# Patient Record
Sex: MALE | Race: WHITE | NOT HISPANIC OR LATINO
[De-identification: names, ages, dates, MRNs, and addresses within clinical notes are randomized per-mention and may not be internally consistent; named-entity substitution may affect disease eponyms.]

---

## 2017-07-12 ENCOUNTER — TRANSCRIPTION ENCOUNTER (OUTPATIENT)
Age: 82
End: 2017-07-12

## 2017-07-12 ENCOUNTER — INPATIENT (INPATIENT)
Facility: HOSPITAL | Age: 82
LOS: 1 days | Discharge: HOME | End: 2017-07-14
Attending: EMERGENCY MEDICINE | Admitting: INTERNAL MEDICINE
Payer: MEDICARE

## 2017-07-12 VITALS
HEART RATE: 78 BPM | SYSTOLIC BLOOD PRESSURE: 140 MMHG | OXYGEN SATURATION: 96 % | RESPIRATION RATE: 18 BRPM | TEMPERATURE: 98 F | DIASTOLIC BLOOD PRESSURE: 67 MMHG

## 2017-07-12 DIAGNOSIS — R79.89 OTHER SPECIFIED ABNORMAL FINDINGS OF BLOOD CHEMISTRY: ICD-10-CM

## 2017-07-12 DIAGNOSIS — I10 ESSENTIAL (PRIMARY) HYPERTENSION: ICD-10-CM

## 2017-07-12 DIAGNOSIS — J44.9 CHRONIC OBSTRUCTIVE PULMONARY DISEASE, UNSPECIFIED: ICD-10-CM

## 2017-07-12 DIAGNOSIS — Z95.0 PRESENCE OF CARDIAC PACEMAKER: Chronic | ICD-10-CM

## 2017-07-12 DIAGNOSIS — N40.0 BENIGN PROSTATIC HYPERPLASIA WITHOUT LOWER URINARY TRACT SYMPTOMS: ICD-10-CM

## 2017-07-12 LAB
ALBUMIN SERPL ELPH-MCNC: 3.5 G/DL — SIGNIFICANT CHANGE UP (ref 3.3–5)
ALP SERPL-CCNC: 110 U/L — SIGNIFICANT CHANGE UP (ref 40–120)
ALT FLD-CCNC: 15 U/L — SIGNIFICANT CHANGE UP (ref 12–78)
AMMONIA BLD-MCNC: 20 UMOL/L — SIGNIFICANT CHANGE UP (ref 11–32)
ANION GAP SERPL CALC-SCNC: 12 MMOL/L — SIGNIFICANT CHANGE UP (ref 5–17)
APAP SERPL-MCNC: <2 UG/ML — LOW (ref 10–30)
APPEARANCE UR: CLEAR — SIGNIFICANT CHANGE UP
AST SERPL-CCNC: 16 U/L — SIGNIFICANT CHANGE UP (ref 15–37)
BILIRUB SERPL-MCNC: 0.4 MG/DL — SIGNIFICANT CHANGE UP (ref 0.2–1.2)
BILIRUB UR-MCNC: NEGATIVE — SIGNIFICANT CHANGE UP
BUN SERPL-MCNC: 15 MG/DL — SIGNIFICANT CHANGE UP (ref 7–23)
CALCIUM SERPL-MCNC: 9.1 MG/DL — SIGNIFICANT CHANGE UP (ref 8.5–10.1)
CHLORIDE SERPL-SCNC: 107 MMOL/L — SIGNIFICANT CHANGE UP (ref 96–108)
CO2 SERPL-SCNC: 23 MMOL/L — SIGNIFICANT CHANGE UP (ref 22–31)
COLOR SPEC: YELLOW — SIGNIFICANT CHANGE UP
CREAT SERPL-MCNC: 1.47 MG/DL — HIGH (ref 0.5–1.3)
DIFF PNL FLD: ABNORMAL
GLUCOSE SERPL-MCNC: 145 MG/DL — HIGH (ref 70–99)
GLUCOSE UR QL: NEGATIVE MG/DL — SIGNIFICANT CHANGE UP
HCT VFR BLD CALC: 34.7 % — LOW (ref 39–50)
HGB BLD-MCNC: 12.3 G/DL — LOW (ref 13–17)
INR BLD: 1 RATIO — SIGNIFICANT CHANGE UP (ref 0.88–1.16)
KETONES UR-MCNC: NEGATIVE — SIGNIFICANT CHANGE UP
LEUKOCYTE ESTERASE UR-ACNC: NEGATIVE — SIGNIFICANT CHANGE UP
MCHC RBC-ENTMCNC: 30.9 PG — SIGNIFICANT CHANGE UP (ref 27–34)
MCHC RBC-ENTMCNC: 35.5 GM/DL — SIGNIFICANT CHANGE UP (ref 32–36)
MCV RBC AUTO: 87 FL — SIGNIFICANT CHANGE UP (ref 80–100)
NITRITE UR-MCNC: NEGATIVE — SIGNIFICANT CHANGE UP
PH UR: 7 — SIGNIFICANT CHANGE UP (ref 5–8)
PLATELET # BLD AUTO: 210 K/UL — SIGNIFICANT CHANGE UP (ref 150–400)
POTASSIUM SERPL-MCNC: 3.7 MMOL/L — SIGNIFICANT CHANGE UP (ref 3.5–5.3)
POTASSIUM SERPL-SCNC: 3.7 MMOL/L — SIGNIFICANT CHANGE UP (ref 3.5–5.3)
PROT SERPL-MCNC: 7.4 GM/DL — SIGNIFICANT CHANGE UP (ref 6–8.3)
PROT UR-MCNC: 15 MG/DL
PROTHROM AB SERPL-ACNC: 10.9 SEC — SIGNIFICANT CHANGE UP (ref 9.8–12.7)
RBC # BLD: 3.99 M/UL — LOW (ref 4.2–5.8)
RBC # FLD: 14.7 % — SIGNIFICANT CHANGE UP (ref 11–15)
SALICYLATES SERPL-MCNC: <1.7 MG/DL — LOW (ref 2.8–20)
SODIUM SERPL-SCNC: 142 MMOL/L — SIGNIFICANT CHANGE UP (ref 135–145)
SP GR SPEC: 1 — LOW (ref 1.01–1.02)
TROPONIN I SERPL-MCNC: <.015 NG/ML — SIGNIFICANT CHANGE UP (ref 0.01–0.04)
UROBILINOGEN FLD QL: NEGATIVE MG/DL — SIGNIFICANT CHANGE UP
WBC # BLD: 10.4 K/UL — SIGNIFICANT CHANGE UP (ref 3.8–10.5)
WBC # FLD AUTO: 10.4 K/UL — SIGNIFICANT CHANGE UP (ref 3.8–10.5)

## 2017-07-12 PROCEDURE — 99223 1ST HOSP IP/OBS HIGH 75: CPT

## 2017-07-12 PROCEDURE — 99291 CRITICAL CARE FIRST HOUR: CPT

## 2017-07-12 PROCEDURE — 70450 CT HEAD/BRAIN W/O DYE: CPT | Mod: 26

## 2017-07-12 PROCEDURE — 99222 1ST HOSP IP/OBS MODERATE 55: CPT

## 2017-07-12 PROCEDURE — 71010: CPT | Mod: 26

## 2017-07-12 RX ORDER — THEOPHYLLINE ANHYDROUS 200 MG
400 TABLET, EXTENDED RELEASE 12 HR ORAL DAILY
Qty: 0 | Refills: 0 | Status: DISCONTINUED | OUTPATIENT
Start: 2017-07-12 | End: 2017-07-13

## 2017-07-12 RX ORDER — MONTELUKAST 4 MG/1
1 TABLET, CHEWABLE ORAL
Qty: 0 | Refills: 0 | COMMUNITY

## 2017-07-12 RX ORDER — DOCUSATE SODIUM 100 MG
100 CAPSULE ORAL
Qty: 0 | Refills: 0 | Status: DISCONTINUED | OUTPATIENT
Start: 2017-07-12 | End: 2017-07-14

## 2017-07-12 RX ORDER — HEPARIN SODIUM 5000 [USP'U]/ML
5000 INJECTION INTRAVENOUS; SUBCUTANEOUS EVERY 12 HOURS
Qty: 0 | Refills: 0 | Status: DISCONTINUED | OUTPATIENT
Start: 2017-07-12 | End: 2017-07-14

## 2017-07-12 RX ORDER — ENOXAPARIN SODIUM 100 MG/ML
80 INJECTION SUBCUTANEOUS ONCE
Qty: 0 | Refills: 0 | Status: COMPLETED | OUTPATIENT
Start: 2017-07-12 | End: 2017-07-12

## 2017-07-12 RX ORDER — LATANOPROST 0.05 MG/ML
1 SOLUTION/ DROPS OPHTHALMIC; TOPICAL
Qty: 0 | Refills: 0 | COMMUNITY

## 2017-07-12 RX ORDER — LISINOPRIL 2.5 MG/1
40 TABLET ORAL DAILY
Qty: 0 | Refills: 0 | Status: DISCONTINUED | OUTPATIENT
Start: 2017-07-12 | End: 2017-07-14

## 2017-07-12 RX ORDER — SODIUM CHLORIDE 9 MG/ML
1000 INJECTION INTRAMUSCULAR; INTRAVENOUS; SUBCUTANEOUS
Qty: 0 | Refills: 0 | Status: DISCONTINUED | OUTPATIENT
Start: 2017-07-12 | End: 2017-07-12

## 2017-07-12 RX ORDER — TIOTROPIUM BROMIDE 18 UG/1
1 CAPSULE ORAL; RESPIRATORY (INHALATION)
Qty: 0 | Refills: 0 | COMMUNITY

## 2017-07-12 RX ORDER — LATANOPROST 0.05 MG/ML
1 SOLUTION/ DROPS OPHTHALMIC; TOPICAL DAILY
Qty: 0 | Refills: 0 | Status: DISCONTINUED | OUTPATIENT
Start: 2017-07-12 | End: 2017-07-12

## 2017-07-12 RX ORDER — TIOTROPIUM BROMIDE 18 UG/1
1 CAPSULE ORAL; RESPIRATORY (INHALATION) DAILY
Qty: 0 | Refills: 0 | Status: DISCONTINUED | OUTPATIENT
Start: 2017-07-12 | End: 2017-07-14

## 2017-07-12 RX ORDER — LATANOPROST 0.05 MG/ML
1 SOLUTION/ DROPS OPHTHALMIC; TOPICAL AT BEDTIME
Qty: 0 | Refills: 0 | Status: DISCONTINUED | OUTPATIENT
Start: 2017-07-12 | End: 2017-07-14

## 2017-07-12 RX ORDER — ALBUTEROL 90 UG/1
2 AEROSOL, METERED ORAL
Qty: 0 | Refills: 0 | COMMUNITY

## 2017-07-12 RX ORDER — METOPROLOL TARTRATE 50 MG
1 TABLET ORAL
Qty: 0 | Refills: 0 | COMMUNITY

## 2017-07-12 RX ORDER — THEOPHYLLINE ANHYDROUS 200 MG
400 TABLET, EXTENDED RELEASE 12 HR ORAL DAILY
Qty: 0 | Refills: 0 | Status: DISCONTINUED | OUTPATIENT
Start: 2017-07-12 | End: 2017-07-12

## 2017-07-12 RX ORDER — MONTELUKAST 4 MG/1
10 TABLET, CHEWABLE ORAL DAILY
Qty: 0 | Refills: 0 | Status: DISCONTINUED | OUTPATIENT
Start: 2017-07-12 | End: 2017-07-14

## 2017-07-12 RX ORDER — METOPROLOL TARTRATE 50 MG
50 TABLET ORAL DAILY
Qty: 0 | Refills: 0 | Status: DISCONTINUED | OUTPATIENT
Start: 2017-07-12 | End: 2017-07-14

## 2017-07-12 RX ORDER — LISINOPRIL 2.5 MG/1
1 TABLET ORAL
Qty: 0 | Refills: 0 | COMMUNITY

## 2017-07-12 RX ORDER — FINASTERIDE 5 MG/1
5 TABLET, FILM COATED ORAL DAILY
Qty: 0 | Refills: 0 | Status: DISCONTINUED | OUTPATIENT
Start: 2017-07-12 | End: 2017-07-14

## 2017-07-12 RX ORDER — SODIUM CHLORIDE 9 MG/ML
1000 INJECTION, SOLUTION INTRAVENOUS
Qty: 0 | Refills: 0 | Status: DISCONTINUED | OUTPATIENT
Start: 2017-07-12 | End: 2017-07-14

## 2017-07-12 RX ORDER — TAMSULOSIN HYDROCHLORIDE 0.4 MG/1
0.4 CAPSULE ORAL AT BEDTIME
Qty: 0 | Refills: 0 | Status: DISCONTINUED | OUTPATIENT
Start: 2017-07-12 | End: 2017-07-14

## 2017-07-12 RX ORDER — THEOPHYLLINE ANHYDROUS 200 MG
1 TABLET, EXTENDED RELEASE 12 HR ORAL
Qty: 0 | Refills: 0 | COMMUNITY

## 2017-07-12 RX ORDER — ALBUTEROL 90 UG/1
2 AEROSOL, METERED ORAL EVERY 6 HOURS
Qty: 0 | Refills: 0 | Status: DISCONTINUED | OUTPATIENT
Start: 2017-07-12 | End: 2017-07-14

## 2017-07-12 RX ADMIN — FINASTERIDE 5 MILLIGRAM(S): 5 TABLET, FILM COATED ORAL at 17:49

## 2017-07-12 RX ADMIN — ENOXAPARIN SODIUM 80 MILLIGRAM(S): 100 INJECTION SUBCUTANEOUS at 12:42

## 2017-07-12 RX ADMIN — Medication 100 MILLIGRAM(S): at 17:49

## 2017-07-12 RX ADMIN — HEPARIN SODIUM 5000 UNIT(S): 5000 INJECTION INTRAVENOUS; SUBCUTANEOUS at 17:49

## 2017-07-12 RX ADMIN — SODIUM CHLORIDE 100 MILLILITER(S): 9 INJECTION, SOLUTION INTRAVENOUS at 17:49

## 2017-07-12 RX ADMIN — TIOTROPIUM BROMIDE 1 CAPSULE(S): 18 CAPSULE ORAL; RESPIRATORY (INHALATION) at 23:32

## 2017-07-12 RX ADMIN — TAMSULOSIN HYDROCHLORIDE 0.4 MILLIGRAM(S): 0.4 CAPSULE ORAL at 23:32

## 2017-07-12 RX ADMIN — MONTELUKAST 10 MILLIGRAM(S): 4 TABLET, CHEWABLE ORAL at 23:31

## 2017-07-12 RX ADMIN — Medication 400 MILLIGRAM(S): at 23:30

## 2017-07-12 NOTE — ED ADULT TRIAGE NOTE - CHIEF COMPLAINT QUOTE
found unresponsive by home attendant. now pt has facial droop. last seen normal 0645. unknown medical history. found unresponsive by home attendant. now pt has facial droop. last seen normal 0645. unknown medical history. fs 137

## 2017-07-12 NOTE — ED PROVIDER NOTE - PROGRESS NOTE DETAILS
pt's son is Jeff Hopkins, [phone], physician, as per A spoke with son, farheen sanchez, he gave further hx on father, updated hpi and pmh  ordered MRI brain now with neuro Dr. BAUTISTA on board, pt. out of window for tpa, will admit med

## 2017-07-12 NOTE — DISCHARGE NOTE ADULT - NS AS DC STROKE ED MATERIALS
Risk Factors for Stroke/Prescribed Medications/Stroke Warning Signs and Symptoms/Stroke Education Booklet/Call 911 for Stroke/Need for Followup After Discharge Prescribed Medications/Need for Followup After Discharge/Stroke Warning Signs and Symptoms/Call 911 for Stroke/Stroke Education Booklet/Risk Factors for Stroke

## 2017-07-12 NOTE — H&P ADULT - HISTORY OF PRESENT ILLNESS
87yr old male PMH HTN, TIA, PPM, BPH, COPD, BIBA by ambulance with AMS, upgraded to code stroke upon presentation. As per EMS pt. was found unresponsive in chair, difficult to arouse. Family was present in the home when EMS arrived as per HHA for pt.'s wife she says pt. was making strange sounds and foaming at the mouth earlier. Pt. normally is AxOx 2-3 communicates and moves well. Upon arrival to the ER pt. was sleepy, arousable to pain, mumbling with no meaningful communication. Pt. is now responsive  AxOx1-2, speech is clear.  Pt. denies chest pain, SOB, palpitations, N&V, chills, fever. 87yr old male PMH HTN, TIA, PPM, BPH, COPD, BIBA by ambulance with AMS, upgraded to code stroke upon presentation. As per EMS pt. was found unresponsive in chair, difficult to arouse. Family was present in the home when EMS arrived as per HHA for pt.'s wife she says pt. was making strange sounds and foaming at the mouth earlier. Pt. normally is AxOx2-3 communicates and moves well. Upon arrival to the ER pt. was sleepy, arousable to pain, mumbling with no meaningful communication. Pt. is now responsive  AxOx1-2, speech is clear.  Pt. denies chest pain, SOB, palpitations, N&V, chills, fever.    Head w/o contrast revealed no acute intracranial hemorrhage or acute territorial infarct.   MRI is contraindicated due to PMH of PPM

## 2017-07-12 NOTE — DISCHARGE NOTE ADULT - OTHER SIGNIFICANT FINDINGS
< from: US Duplex Carotid Arteries Complete, Bilateral (07.13.17 @ 13:52) >  Impression: No hemodynamically significant stenosis in the right internal   carotid artery by velocity criteria.    Moderate stenosis (50-69%) in the left internal carotid artery by   velocity criteria.    Apparent 1 episode of cardiac arrhythmia is noted.      < from: TTE Echo Doppler w/o Cont (07.13.17 @ 12:20) >   Summary:   1. Left ventricular ejection fraction, byvisual estimation, is 60 to   65%.   2. Normal global left ventricular systolic function.   3. Normal left ventricular internal cavity size.   4. Normal right ventricular size and function.   5. There is no evidence of pericardial effusion.   6. Mildmitral annular calcification.   7. Mild mitral valve regurgitation.   8. Thickening and calcification of the anterior and posterior mitral   valve leaflets.   9. Sclerotic aortic valve with normal opening.  10. Mildly enlarged left atrium.  11. Thereis mild aortic root calcification.    < from: CT Brain Stroke Protocol (07.12.17 @ 08:01) >    IMPRESSION:  No acute intracranial hemorrhage or acute territorial infarct. If acute   ischemia is a strong clinical concern, MRI exam is recommended for   further evaluation if there is no contraindication.    8 mm nonspecific lucent lesion right frontal calvarium to small to   characterize could represent hemangioma. Nonemergent MRI exam can be   obtained for further evaluation to exclude other neoplastic lesions.

## 2017-07-12 NOTE — ED ADULT NURSE NOTE - CHIEF COMPLAINT QUOTE
found unresponsive by home attendant. now pt has facial droop. last seen normal 0645. unknown medical history. fs 137

## 2017-07-12 NOTE — DISCHARGE NOTE ADULT - ADDITIONAL INSTRUCTIONS
follow with PCP Dr. Branham at [facility], ([facility] and [facility]. [facility] in [place])x 3 days  follow with Neurology x 1 week  follow with Cardiology x 1 week

## 2017-07-12 NOTE — H&P ADULT - PMH
BPH (benign prostatic hyperplasia)    COPD (chronic obstructive pulmonary disease)    HTN (hypertension)    TIA (transient ischemic attack) BPH (benign prostatic hyperplasia)    COPD (chronic obstructive pulmonary disease)    Glaucoma    HTN (hypertension)    TIA (transient ischemic attack)

## 2017-07-12 NOTE — DISCHARGE NOTE ADULT - CONDITION (STATED IN TERMS THAT PERMIT A SPECIFIC MEASURABLE COMPARISON WITH CONDITION ON ADMISSION):
Patient is stable: tolerating diet, voiding, ambulating, no pain.  Vital Signs Last 24 Hrs  T(C): 37 (14 Jul 2017 10:45), Max: 37.1 (14 Jul 2017 06:02)  T(F): 98.6 (14 Jul 2017 10:45), Max: 98.8 (14 Jul 2017 06:02)  HR: 62 (14 Jul 2017 10:45) (60 - 63)  BP: 112/53 (14 Jul 2017 10:45) (112/53 - 150/60)  BP(mean): --  RR: 18 (14 Jul 2017 10:45) (18 - 18)  SpO2: 97% (14 Jul 2017 10:45) (97% - 98%)  75 minutes spent in direct patient care including physical examination, discharge instructions , medication instructions and follow up, patient verbalized understanding and agreed.

## 2017-07-12 NOTE — DISCHARGE NOTE ADULT - HOSPITAL COURSE
87yr old male PMH HTN, TIA, PPM, BPH, COPD, BIBA by ambulance with AMS, upgraded to code stroke upon presentation. As per EMS pt. was found unresponsive in chair, difficult to arouse. Family was present in the home when EMS arrived as per HHA for pt.'s wife she says pt. was making strange sounds and foaming at the mouth earlier. Pt. normally is AxOx2-3 communicates and moves well. Upon arrival to the ER pt. was sleepy, arousable to pain, mumbling with no meaningful communication. In ED upon hospitalist eval, Pt.  responsive  AxOx1-2, speech is clear.  Pt. denies chest pain, SOB, palpitations, N&V, chills, fever.    Head CT w/o contrast revealed no acute intracranial hemorrhage or acute territorial infarct.   MRI is contraindicated due to PMH of PPM , C. doppler with left ICA mild stenosis.  Neurology and cardio consulted, CTH negative for acute stroke, for carotid stenosis vascular surgery shay/Dr. Ashraf called, d/w him, recommend medical Rx and outpatient f/u, no intervention.  As per cardio recomm, he had PPM interrogation, with ?PAF, d/w cardio, as well as with patient, Patient not a candidate for long term AC given fall risk, and elderly, pt in agreement with it. as per wife HHA at home as well as patient pharmacy he was not on any AC in past. Called phone # of son provided in chart , both # not working ([phone]/ [phone] ) Patient states he does not remember son's phone # as son lives in [place].  His lasix was held upon admission given BOB, now creatinine back to baseline and pt to follow with PCP and primary cardiologist as outpatient.  For abnormal lucent lesion on CTH  pt is advised to follow with PCP for further w/u as outpatient.

## 2017-07-12 NOTE — H&P ADULT - PROBLEM SELECTOR PLAN 2
Continue metoprolol 50mg, lisinopril 40mg   2D-echo   telemetry monitoring  Cardiology consult Dr. Ga

## 2017-07-12 NOTE — ED PROVIDER NOTE - PHYSICAL EXAMINATION
Vitals: WNL  Gen: sleepy, but arousable to pain, NAD, lying comfortably in stretcher, answers to his name, mumbles, no meaningful communication  Head: ncat, perrla, eomi b/l  Neck: supple, no lymphadenopathy, no midline deviation  Heart: rrr, no m/r/g  Lungs: CTA b/l, no rales/ronchi/wheezes  Abd: soft, nontender, non-distended, no rebound or guarding  Ext: no clubbing/cyanosis/edema  Neuro: will not follow commands for exam, withdrawing from pain equally in all 4 ext, no facial droop appreciated, no drooling

## 2017-07-12 NOTE — CONSULT NOTE ADULT - SUBJECTIVE AND OBJECTIVE BOX
Chief Complaint:  Patient is a 87y old  Male who presents with a chief complaint of "I just passed out". (2017 13:50)      HPI:  87yr old male PMH HTN, TIA, PPM, BPH, COPD, BIBA by ambulance with AMS, upgraded to code stroke upon presentation. As per EMS, pt. was found unresponsive in chair, difficult to arouse. Family was present in the home when EMS arrived as per HHA for pt.'s wife she says pt. was making strange sounds and foaming at the mouth earlier. Pt. normally is AxOx2-3 communicates and moves well. Upon arrival to the ER pt. was sleepy, arousable to pain, mumbling with no meaningful communication. Pt. is now responsive  AxOx1-2, speech is clear.  Pt. denies chest pain, SOB, palpitations, N&V, chills, fever.    Head w/o contrast revealed no acute intracranial hemorrhage or acute territorial infarct.   MRI is contraindicated due to PMH of PPM (2017 12:18)    Allergies and Intolerances:        Allergies:  	No Known Allergies:     Home Medications:   * Incomplete Medication History as of 2017 08:12 documented in Prescription Writer      Past Medical History:  BPH (benign prostatic hyperplasia)    COPD (chronic obstructive pulmonary disease)    Glaucoma    HTN (hypertension)    TIA (transient ischemic attack).    Past Surgical History:  Cardiac pacemaker.    Tobacco Screening:  · Core Measure Site	Yes	  · Has the patient used tobacco in the past 30 days? 	No	  Review of Systems:  General:  No wt loss, fevers, chills, night sweats; positive for syncope.  Eyes:  Good vision, no reported pain  ENT:  No sore throat, pain, runny nose, dysphagia  CV:  No pain, palpitations hypo/hypertension  Resp:  No dyspnea, cough, tachypnea, wheezing  GI:  No pain, nausea, vomiting, diarrhea, constipatiion  :  No pain, bleeding, incontinence, nocturia  Muscle:  No pain, weakness  Breast:  No pain, abscess, mass, discharge  Neuro:  No weakness, tingling, memory problems  Psych:  No fatigue, insomnia, mood problems, depression  Endocrine:  No polyuria, polydipsia cold/heat intolerance  Heme:  No petechiae, ecchymosis, easy bruisability  Skin:  No rash, edema      Physical Exam:  Vital Signs:  Vital Signs Last 24 Hrs  T(C): 36.9 (2017 15:14), Max: 36.9 (2017 15:14)  T(F): 98.4 (2017 15:14), Max: 98.4 (2017 15:14)  HR: 64 (2017 15:14) (64 - 78)  BP: 136/80 (2017 15:14) (136/80 - 140/69)  RR: 16 (2017 15:14) (16 - 18)  SpO2: 98% (2017 15:14) (96% - 100%)  Daily Height in cm: 172.72 (2017 15:14)      General:  Appears stated age, well-groomed, well-nourished, no distress  HEENT:  NC/AT, patent nares w/ pink mucosa, OP clear w/o lesions, conjunctivae clear, no thyromegaly, nodules, adenopathy, no JVD  Chest:  Full & symmetric excursion, no increased effort, breath sounds clear  Cardiovascular:  Regular rhythm, S1, S2, no murmur/rub/S3/S4, no carotid/femoral/abdominal bruit, radial/pedal pulses 2+  Abdomen:  Soft, non-tender, non-distended, normoactive bowel sounds  Extremities:  no edema  Skin:  No rash. Skin is warm/dry  Musculoskeletal:  N/A  Neuro/Psych:  Alert, oriented    Laboratory:                            12.3   10.4  )-----------( 210      ( 2017 08:13 )             34.7     07-12    142  |  107  |  15  ----------------------------<  145<H>  3.7   |  23  |  1.47<H>    Ca    9.1      2017 08:13    TPro  7.4  /  Alb  3.5  /  TBili  0.4  /  DBili  x   /  AST  16  /  ALT  15  /  AlkPhos  110  07-12      CARDIAC MARKERS ( 2017 08:13 )  <.015 ng/mL / x     / x     / x     / x            LIVER FUNCTIONS - ( 2017 08:13 )  Alb: 3.5 g/dL / Pro: 7.4 gm/dL / ALK PHOS: 110 U/L / ALT: 15 U/L / AST: 16 U/L / GGT: x           PT/INR - ( 2017 08:13 )   PT: 10.9 sec;   INR: 1.00 ratio         Urinalysis Basic - ( 2017 08:43 )    Color: Yellow / Appearance: Clear / S.005 / pH: x  Gluc: x / Ketone: Negative  / Bili: Negative / Urobili: Negative mg/dL   Blood: x / Protein: 15 mg/dL / Nitrite: Negative   Leuk Esterase: Negative / RBC: 6-10 /HPF / WBC x   Sq Epi: x / Non Sq Epi: x / Bacteria: x        Imaging:  < from: Xray Chest 1 View AP/PA. (17 @ 08:55) >    EXAM:  CHEST SINGLE VIEW                            PROCEDURE DATE:  2017          INTERPRETATION:    DATE OF EXAM:  .    COMPARISON: 08.    CLINICAL INDICATION: 87-year-old male - for stroke  code - has abnormal   chest sounds.    TECHNIQUE: Portable chest.    FINDINGS:   S/P sternotomy. There has been interval placement of left-sided AICD with   intact electrodes to the heart.   The cardiac and mediastinal contours are prominent, which may be due to   exaggeration from AP technique and shallow inspiration.   Indistinctness of bronchovascular markings in the perihilar regions may   be due to mild interstitial edema. There are linear atelectases at left   lung base. No bilateral focal infiltrates. No significant pleural   effusion. No pneumothorax.  There are degenerative changes of the thoracic spine.    IMPRESSION:   Cannot exclude mild interstitial edema changes as above.  No discrete bilateral focal infiltrates.    < end of copied text >    < from: CT Brain Stroke Protocol (17 @ 08:01) >  EXAM:  CT BRAIN STROKE PROTOCOL                            PROCEDURE DATE:  2017          INTERPRETATION:  CLINICAL STATEMENT: AMS. Code stroke    TECHNIQUE: CT of the head was performed without IV contrast. Notified for   interpretation on 2017 at 8:14 AM    COMPARISON: None.    FINDINGS:  There is moderate diffuse parenchymal volume loss. There are areas of low   attenuation in the periventricular white matter likely related to mild   chronic microvascular ischemic changes.    There is no acute parenchymal hemorrhage, parenchymal mass, mass effect   or midline shift. There is no extra-axial fluid collection. There is no   acute territorial infarct. There is no hydrocephalus. Nonspecific   prominence of bilateral optic nerve sheaths noted.    The cranium is intact. Nonspecific 8 mm lucent lesion noted in the right   frontal calvarium too Small to characterize    Mucosal thickening paranasal sinuses.    IMPRESSION:  No acute intracranial hemorrhage or acute territorial infarct. If acute   ischemia is a strong clinical concern, MRI exam is recommended for   further evaluation if there is no contraindication.    8 mm nonspecific lucent lesion right frontal calvarium to small to   characterize could represent hemangioma. Nonemergent MRI exam can be   obtained for further evaluation to exclude other neoplastic lesions.    < end of copied text >    Assessment:  87yr old male PMH HTN, TIA, PPM, BPH, COPD, BIBA by ambulance with AMS, upgraded to code stroke upon presentation. As per EMS, pt. was found unresponsive in chair, difficult to arouse. Family was present in the home when EMS arrived as per HHA for pt.'s wife she says pt. was making strange sounds and foaming at the mouth earlier. Pt. normally is AxOx2-3 communicates and moves well. Upon arrival to the ER pt. was sleepy, arousable to pain, mumbling with no meaningful communication. Pt. is now responsive  AxOx1-2, speech is clear.  Pt. denies chest pain, SOB, palpitations, N&V, chills, fever.    Head w/o contrast revealed no acute intracranial hemorrhage or acute territorial infarct.     Plan:     Tele monitoring.  Check echo.    Con't with:  MEDICATIONS  (STANDING):  heparin  Injectable 5000 Unit(s) SubCutaneous every 12 hours  finasteride 5 milliGRAM(s) Oral daily  lisinopril 40 milliGRAM(s) Oral daily  tamsulosin 0.4 milliGRAM(s) Oral at bedtime  metoprolol succinate ER 50 milliGRAM(s) Oral daily  tiotropium 18 MICROgram(s) Capsule 1 Capsule(s) Inhalation daily  docusate sodium 100 milliGRAM(s) Oral two times a day  montelukast 10 milliGRAM(s) Oral daily  latanoprost 0.005% Ophthalmic Solution 1 Drop(s) Both EYES at bedtime  theophylline SR Tablet 400 milliGRAM(s) Oral daily  dextrose 5% + sodium chloride 0.9%. 1000 milliLiter(s) (100 mL/Hr) IV Continuous <Continuous>    Supportive care management.    Gianfranco Gray MD, FACC, FASE, FASNC, FACP  Director, Heart Failure Services  [facility]  , Department of Cardiology  [facility] Chief Complaint:  Patient is a 87y old  Male who presents with a chief complaint of "I just passed out". (2017 13:50)      HPI:  87yr old male PMH HTN, TIA, PPM, BPH, COPD, BIBA by ambulance with AMS, upgraded to code stroke upon presentation. As per EMS, pt. was found unresponsive in chair, difficult to arouse. Family was present in the home when EMS arrived as per HHA for pt.'s wife she says pt. was making strange sounds and foaming at the mouth earlier. Pt. normally is AxOx2-3 communicates and moves well. Upon arrival to the ER pt. was sleepy, arousable to pain, mumbling with no meaningful communication. Pt. is now responsive  AxOx1-2, speech is clear.  Pt. denies chest pain, SOB, palpitations, N&V, chills, fever.    Head w/o contrast revealed no acute intracranial hemorrhage or acute territorial infarct.   MRI is contraindicated due to PMH of PPM (2017 12:18)    Allergies and Intolerances:        Allergies:  	No Known Allergies:     Home Medications:   * Incomplete Medication History as of 2017 08:12 documented in Prescription Writer      Past Medical History:  BPH (benign prostatic hyperplasia)    COPD (chronic obstructive pulmonary disease)    Glaucoma    HTN (hypertension)    TIA (transient ischemic attack).    Past Surgical History:  Cardiac pacemaker.    Tobacco Screening:  · Core Measure Site	Yes	  · Has the patient used tobacco in the past 30 days? 	No	  Review of Systems:  General:  No wt loss, fevers, chills, night sweats; positive for syncope.  Eyes:  Good vision, no reported pain  ENT:  No sore throat, pain, runny nose, dysphagia  CV:  No pain, palpitations hypo/hypertension  Resp:  No dyspnea, cough, tachypnea, wheezing  GI:  No pain, nausea, vomiting, diarrhea, constipatiion  :  No pain, bleeding, incontinence, nocturia  Muscle:  No pain, weakness  Breast:  No pain, abscess, mass, discharge  Neuro:  No weakness, tingling, memory problems  Psych:  No fatigue, insomnia, mood problems, depression  Endocrine:  No polyuria, polydipsia cold/heat intolerance  Heme:  No petechiae, ecchymosis, easy bruisability  Skin:  No rash, edema      Physical Exam:  Vital Signs:  Vital Signs Last 24 Hrs  T(C): 36.9 (2017 15:14), Max: 36.9 (2017 15:14)  T(F): 98.4 (2017 15:14), Max: 98.4 (2017 15:14)  HR: 64 (2017 15:14) (64 - 78)  BP: 136/80 (2017 15:14) (136/80 - 140/69)  RR: 16 (2017 15:14) (16 - 18)  SpO2: 98% (2017 15:14) (96% - 100%)  Daily Height in cm: 172.72 (2017 15:14)      Tele: V-paced.  General:  Appears stated age, well-groomed, well-nourished, no distress  HEENT:  NC/AT, patent nares w/ pink mucosa, OP clear w/o lesions, conjunctivae clear, no thyromegaly, nodules, adenopathy, no JVD  Chest:  Full & symmetric excursion, no increased effort, breath sounds clear  Cardiovascular:  Regular rhythm, S1, S2, no murmur/rub/S3/S4, no carotid/femoral/abdominal bruit, radial/pedal pulses 2+  Abdomen:  Soft, non-tender, non-distended, normoactive bowel sounds  Extremities:  no edema  Skin:  No rash. Skin is warm/dry  Musculoskeletal:  N/A  Neuro/Psych:  Alert, oriented    Laboratory:                            12.3   10.4  )-----------( 210      ( 2017 08:13 )             34.7     07-12    142  |  107  |  15  ----------------------------<  145<H>  3.7   |  23  |  1.47<H>    Ca    9.1      2017 08:13    TPro  7.4  /  Alb  3.5  /  TBili  0.4  /  DBili  x   /  AST  16  /  ALT  15  /  AlkPhos  110  07-12      CARDIAC MARKERS ( 2017 08:13 )  <.015 ng/mL / x     / x     / x     / x            LIVER FUNCTIONS - ( 2017 08:13 )  Alb: 3.5 g/dL / Pro: 7.4 gm/dL / ALK PHOS: 110 U/L / ALT: 15 U/L / AST: 16 U/L / GGT: x           PT/INR - ( 2017 08:13 )   PT: 10.9 sec;   INR: 1.00 ratio         Urinalysis Basic - ( 2017 08:43 )    Color: Yellow / Appearance: Clear / S.005 / pH: x  Gluc: x / Ketone: Negative  / Bili: Negative / Urobili: Negative mg/dL   Blood: x / Protein: 15 mg/dL / Nitrite: Negative   Leuk Esterase: Negative / RBC: 6-10 /HPF / WBC x   Sq Epi: x / Non Sq Epi: x / Bacteria: x        Imaging:  ecg: V-paced rhythm    < from: Xray Chest 1 View AP/PA. (17 @ 08:55) >    EXAM:  CHEST SINGLE VIEW                            PROCEDURE DATE:  2017          INTERPRETATION:    DATE OF EXAM:  .    COMPARISON: 08.    CLINICAL INDICATION: 87-year-old male - for stroke  code - has abnormal   chest sounds.    TECHNIQUE: Portable chest.    FINDINGS:   S/P sternotomy. There has been interval placement of left-sided AICD with   intact electrodes to the heart.   The cardiac and mediastinal contours are prominent, which may be due to   exaggeration from AP technique and shallow inspiration.   Indistinctness of bronchovascular markings in the perihilar regions may   be due to mild interstitial edema. There are linear atelectases at left   lung base. No bilateral focal infiltrates. No significant pleural   effusion. No pneumothorax.  There are degenerative changes of the thoracic spine.    IMPRESSION:   Cannot exclude mild interstitial edema changes as above.  No discrete bilateral focal infiltrates.    < end of copied text >    < from: CT Brain Stroke Protocol (17 @ 08:01) >  EXAM:  CT BRAIN STROKE PROTOCOL                            PROCEDURE DATE:  2017          INTERPRETATION:  CLINICAL STATEMENT: AMS. Code stroke    TECHNIQUE: CT of the head was performed without IV contrast. Notified for   interpretation on 2017 at 8:14 AM    COMPARISON: None.    FINDINGS:  There is moderate diffuse parenchymal volume loss. There are areas of low   attenuation in the periventricular white matter likely related to mild   chronic microvascular ischemic changes.    There is no acute parenchymal hemorrhage, parenchymal mass, mass effect   or midline shift. There is no extra-axial fluid collection. There is no   acute territorial infarct. There is no hydrocephalus. Nonspecific   prominence of bilateral optic nerve sheaths noted.    The cranium is intact. Nonspecific 8 mm lucent lesion noted in the right   frontal calvarium too Small to characterize    Mucosal thickening paranasal sinuses.    IMPRESSION:  No acute intracranial hemorrhage or acute territorial infarct. If acute   ischemia is a strong clinical concern, MRI exam is recommended for   further evaluation if there is no contraindication.    8 mm nonspecific lucent lesion right frontal calvarium to small to   characterize could represent hemangioma. Nonemergent MRI exam can be   obtained for further evaluation to exclude other neoplastic lesions.    < end of copied text >    Assessment:  87yr old male PMH HTN, TIA, PPM, BPH, COPD, BIBA by ambulance with AMS, upgraded to code stroke upon presentation. As per EMS, pt. was found unresponsive in chair, difficult to arouse. Family was present in the home when EMS arrived as per HHA for pt.'s wife she says pt. was making strange sounds and foaming at the mouth earlier. Pt. normally is AxOx2-3 communicates and moves well. Upon arrival to the ER pt. was sleepy, arousable to pain, mumbling with no meaningful communication. Pt. is now responsive  AxOx1-2, speech is clear.  Pt. denies chest pain, SOB, palpitations, N&V, chills, fever.    Head w/o contrast revealed no acute intracranial hemorrhage or acute territorial infarct.     Plan:     Tele monitoring.  Check echo.    Con't with:  MEDICATIONS  (STANDING):  heparin  Injectable 5000 Unit(s) SubCutaneous every 12 hours  finasteride 5 milliGRAM(s) Oral daily  lisinopril 40 milliGRAM(s) Oral daily  tamsulosin 0.4 milliGRAM(s) Oral at bedtime  metoprolol succinate ER 50 milliGRAM(s) Oral daily  tiotropium 18 MICROgram(s) Capsule 1 Capsule(s) Inhalation daily  docusate sodium 100 milliGRAM(s) Oral two times a day  montelukast 10 milliGRAM(s) Oral daily  latanoprost 0.005% Ophthalmic Solution 1 Drop(s) Both EYES at bedtime  theophylline SR Tablet 400 milliGRAM(s) Oral daily  dextrose 5% + sodium chloride 0.9%. 1000 milliLiter(s) (100 mL/Hr) IV Continuous <Continuous>    Supportive care management.    Gianfranco Gray MD, FACC, FASE, FASNC, FACP  Director, Heart Failure Services  [facility]  , Department of Cardiology  [facility] [place] [facility]

## 2017-07-12 NOTE — H&P ADULT - ASSESSMENT
87yr old male PMH HTN, TIA, PPM, BPH, COPD, BIBA by ambulance with AMS, upgraded to code stroke upon presentation. Pt. was found unresponsive in chair, difficult to arouse. Per HHA for pt.'s wife she says pt. was making strange sounds and foaming at the mouth earlier. Pt. normally is AxOx2-3 communicates and moves well. Upon arrival to the ER pt. was sleepy, arousable to pain, mumbling with no meaningful communication. Pt. is now responsive  AxOx1-2, speech is clear.    Head w/o contrast revealed no acute intracranial hemorrhage or acute territorial infarct.   MRI is contraindicated due to PMH of PPM

## 2017-07-12 NOTE — DISCHARGE NOTE ADULT - CARE PROVIDERS DIRECT ADDRESSES
,DirectAddress_Unknown,DirectAddress_Unknown,[email or web address]

## 2017-07-12 NOTE — H&P ADULT - NSHPREVIEWOFSYSTEMS_GEN_ALL_CORE
CONSTITUTIONAL: No fever, weight loss, or fatigue  EYES: No eye pain, visual disturbances, or discharge  ENMT:  No difficulty hearing, tinnitus, vertigo; No sinus or throat pain  NECK: No pain or stiffness  BREASTS: No pain, masses, or nipple discharge  RESPIRATORY: No cough, wheezing, chills or hemoptysis; No shortness of breath  CARDIOVASCULAR: No chest pain, palpitations, dizziness, or leg swelling  GASTROINTESTINAL: No abdominal or epigastric pain. No nausea, vomiting, or hematemesis; No diarrhea or constipation. No melena or hematochezia.  GENITOURINARY: No dysuria, frequency, hematuria, or incontinence  NEUROLOGICAL: No headaches, memory loss, loss of strength, numbness, or tremors  SKIN: No itching, burning, rashes, or lesions   LYMPH NODES: No enlarged glands  ENDOCRINE: No heat or cold intolerance; No hair loss  MUSCULOSKELETAL: No joint pain or swelling; No muscle, back, or extremity pain  PSYCHIATRIC: No depression, anxiety, mood swings, or difficulty sleeping  HEME/LYMPH: No easy bruising, or bleeding gums  ALLERGY AND IMMUNOLOGIC: No hives or eczema CONSTITUTIONAL: No fever, weight loss, or fatigue  EYES: No eye pain, visual disturbances, or discharge  ENMT:  No difficulty hearing, tinnitus, vertigo; No sinus or throat pain  NECK: No pain or stiffness  BREASTS: No pain, masses, or nipple discharge  RESPIRATORY: No cough, wheezing, chills or hemoptysis; No shortness of breath  CARDIOVASCULAR: No chest pain, palpitations, dizziness, or leg swelling  GASTROINTESTINAL: No abdominal or epigastric pain. No nausea, vomiting, or hematemesis; No diarrhea or constipation. No melena or hematochezia.  GENITOURINARY: No dysuria, frequency, hematuria, or incontinence  NEUROLOGICAL: No headaches, loss of strength, numbness, or tremors  SKIN: No itching, burning, rashes, or lesions   LYMPH NODES: No enlarged glands  ENDOCRINE: No heat or cold intolerance; No hair loss  MUSCULOSKELETAL: No joint pain or swelling; No muscle, back, or extremity pain  PSYCHIATRIC: No depression, anxiety, mood swings, or difficulty sleeping  HEME/LYMPH: No easy bruising, or bleeding gums  ALLERGY AND IMMUNOLOGIC: No hives or eczema

## 2017-07-12 NOTE — H&P ADULT - NSHPLABSRESULTS_GEN_ALL_CORE
No acute intracranial hemorrhage or acute territorial infarct.   8 mm nonspecific lucent lesion right frontal calvarium to small to   characterize could represent hemangioma.

## 2017-07-12 NOTE — ED ADULT NURSE NOTE - OBJECTIVE STATEMENT
patient received, awake, responds to verbal although unable to follow commands and appears to have difficulty speaking. no facial droop noted.

## 2017-07-12 NOTE — PATIENT PROFILE ADULT. - FUNCTIONAL SCREEN CURRENT LEVEL: COMMUNICATION, MLM
(2) difficulty understanding (not related to language barrier) (0) understands/communicates without difficulty/patient is hard of hearing

## 2017-07-12 NOTE — H&P ADULT - NSHPPHYSICALEXAM_GEN_ALL_CORE
GENERAL: NAD, well-groomed, well-developed  HEAD:  Atraumatic, Normocephalic  EYES: EOMI, PERRLA, conjunctiva and sclera clear  ENMT: No tonsillar erythema, exudates, or enlargement; Moist mucous membranes, Good dentition, No lesions  NECK: Supple, No JVD, Normal thyroid  NERVOUS SYSTEM:  Alert & Oriented X3, Good concentration; Motor Strength 5/5 B/L upper and lower extremities; DTRs 2+ intact and symmetric  CHEST/LUNG: Clear to auscultation bilaterally; No rales, rhonchi, wheezing, or rubs  HEART: Regular rate and rhythm; No murmurs, rubs, or gallops  ABDOMEN: Soft, Nontender, Nondistended; Bowel sounds present  EXTREMITIES:  2+ Peripheral Pulses, No clubbing, cyanosis, or edema  LYMPH: No lymphadenopathy noted  SKIN: No rashes or lesions GENERAL: NAD, well-groomed, well-developed  HEAD:  Atraumatic, Normocephalic  EYES: EOMI, PERRLA, conjunctiva and sclera clear  ENMT: No tonsillar erythema, exudates, or enlargement; Moist mucous membranes, Good dentition, No lesions  NECK: Supple, No JVD, Normal thyroid  NERVOUS SYSTEM:  Alert & Oriented 1-2, poor concentration; no facial droop, Motor Strength 5/5 B/L upper and lower extremities; DTRs 2+ intact and symmetric  CHEST/LUNG: Clear to auscultation bilaterally; No rales, rhonchi, wheezing, or rubs  HEART: Regular rate and rhythm; No murmurs, rubs, or gallops  ABDOMEN: Soft, Nontender, Nondistended; Bowel sounds present  EXTREMITIES:  2+ Peripheral Pulses, No clubbing, cyanosis, or edema  LYMPH: No lymphadenopathy noted  SKIN: abrasion ? rash/ skin tear noted on right shoulder, stage I noted on sacrum

## 2017-07-12 NOTE — DISCHARGE NOTE ADULT - CARE PROVIDER_API CALL
PCP,   Follow with PCP at [facility] (in [place]) x within 3 days.  Phone: (   )    -  Fax: (   )    -    Roger Woodson), Neurology  [address] [place] [address]  Phone: [phone]  Fax: [phone]    Gianfranco Gray), Cardiovascular Disease; Internal Medicine; Nuclear Cardiology  [address] [place] [address]  Phone: [phone]  Fax: [phone]

## 2017-07-12 NOTE — DISCHARGE NOTE ADULT - NS AS DC FOLLOWUP STROKE INST
Stroke (includes: TIA/SAH/ICH/Ischemic Stroke) Smoking Cessation Smoking Cessation/Stroke (includes: TIA/SAH/ICH/Ischemic Stroke)

## 2017-07-12 NOTE — ED ADULT NURSE REASSESSMENT NOTE - NS ED NURSE REASSESS COMMENT FT1
patient is able to talk but does not answer questions correctly, appears to say other things not related to the question. patient still appears confused

## 2017-07-12 NOTE — ED PROVIDER NOTE - MEDICAL DECISION MAKING DETAILS
86 yo M presents from home, last known well now known  -ct stroke protocol, cardiac monitor, ekg, cxr, finger stick  -cbc, cmp, pt, type and screen, asa and tylenol levels, trop  -f/u results, contact family for full hx

## 2017-07-12 NOTE — DISCHARGE NOTE ADULT - SECONDARY DIAGNOSIS.
Chronic obstructive pulmonary disease, unspecified COPD type Benign prostatic hyperplasia without lower urinary tract symptoms, unspecified morphology Essential hypertension Hemangioma Stenosis of left carotid artery PAF (paroxysmal atrial fibrillation)

## 2017-07-12 NOTE — DISCHARGE NOTE ADULT - PATIENT PORTAL LINK FT
“You can access the Follow[employer] Patient Portal, offered by [facility], by registering with the following website: [email or web address]”

## 2017-07-12 NOTE — DISCHARGE NOTE ADULT - MEDICATION SUMMARY - MEDICATIONS TO TAKE
I will START or STAY ON the medications listed below when I get home from the hospital:    Outpatient PT  -- For Balance and strength training of Bilateral LE.  -- Indication: For Physical therapy    finasteride 5 mg oral tablet  -- 1 tab(s) by mouth once a day  -- Indication: For BPH (benign prostatic hyperplasia)    aspirin 81 mg oral tablet, chewable  -- 1 tab(s) by mouth once a day  -- Indication: For TIA (transient ischemic attack)    lisinopril 40 mg oral tablet  -- 1 tab(s) by mouth once a day  -- Indication: For HTN (hypertension)    tamsulosin 0.4 mg oral capsule  -- 1 cap(s) by mouth once a day (at bedtime)  -- Indication: For BPH (benign prostatic hyperplasia)    atorvastatin 20 mg oral tablet  -- 1 tab(s) by mouth once a day (at bedtime)  -- Indication: For TIA (transient ischemic attack)    Toprol-XL 50 mg oral tablet, extended release  -- 1 tab(s) by mouth once a day  -- Indication: For HTN (hypertension)    theophylline 400 mg/24 hours oral capsule, extended release  -- 1 cap(s) by mouth once a day  -- Indication: For COPD (chronic obstructive pulmonary disease)    Spiriva 18 mcg inhalation capsule  -- 1 cap(s) inhaled once a day  -- Indication: For COPD (chronic obstructive pulmonary disease)    Advair Diskus 250 mcg-50 mcg inhalation powder  -- 1 puff(s) inhaled 2 times a day  -- Indication: For COPD (chronic obstructive pulmonary disease)    ProAir HFA 90 mcg/inh inhalation aerosol  -- 2 puff(s) inhaled 4 times a day, As Needed  -- Indication: For COPD (chronic obstructive pulmonary disease)    furosemide 40 mg oral tablet  -- 1 tab(s) by mouth once a day  -- Indication: For HTN (hypertension)    Colace 100 mg oral capsule  -- 1 cap(s) by mouth 2 times a day  -- Indication: For COnstipation    Singulair 10 mg oral tablet  -- 1 tab(s) by mouth once a day  -- Indication: For COPD (chronic obstructive pulmonary disease)    Xalatan 0.005% ophthalmic solution  -- 1 drop(s) to each affected eye once a day (in the morning)  -- Indication: For Glaucoma I will START or STAY ON the medications listed below when I get home from the hospital:    Outpatient PT  -- For Balance and strength training of Bilateral LE.  -- Indication: For Weakness    finasteride 5 mg oral tablet  -- 1 tab(s) by mouth once a day  -- Indication: For BPH (benign prostatic hyperplasia)    aspirin 81 mg oral tablet, chewable  -- 1 tab(s) by mouth once a day  -- Indication: For Ischemic stroke    lisinopril 40 mg oral tablet  -- 1 tab(s) by mouth once a day  -- Indication: For HTN (hypertension)    tamsulosin 0.4 mg oral capsule  -- 1 cap(s) by mouth once a day (at bedtime)  -- Indication: For BPH (benign prostatic hyperplasia)    atorvastatin 20 mg oral tablet  -- 1 tab(s) by mouth once a day (at bedtime)  -- Indication: For HLD    Toprol-XL 50 mg oral tablet, extended release  -- 1 tab(s) by mouth once a day  -- Indication: For Essential hypertension    theophylline 400 mg/24 hours oral capsule, extended release  -- 1 cap(s) by mouth once a day  -- Indication: For COPD (chronic obstructive pulmonary disease)    Spiriva 18 mcg inhalation capsule  -- 1 cap(s) inhaled once a day  -- Indication: For COPD (chronic obstructive pulmonary disease)    Advair Diskus 250 mcg-50 mcg inhalation powder  -- 1 puff(s) inhaled 2 times a day  -- Indication: For COPD (chronic obstructive pulmonary disease)    ProAir HFA 90 mcg/inh inhalation aerosol  -- 2 puff(s) inhaled 4 times a day, As Needed  -- Indication: For COPD (chronic obstructive pulmonary disease)    furosemide 40 mg oral tablet  -- 1 tab(s) by mouth once a day  -- Indication: For Essential hypertension    Colace 100 mg oral capsule  -- 1 cap(s) by mouth 2 times a day  -- Indication: For COnstipation    Singulair 10 mg oral tablet  -- 1 tab(s) by mouth once a day  -- Indication: For COPD (chronic obstructive pulmonary disease)    Xalatan 0.005% ophthalmic solution  -- 1 drop(s) to each affected eye once a day (in the morning)  -- Indication: For Glaucoma

## 2017-07-12 NOTE — DISCHARGE NOTE ADULT - PROVIDER TOKENS
FREE:[LAST:[PCP],PHONE:[(   )    -],FAX:[(   )    -],ADDRESS:[Follow with PCP at [facility] (in [place]) x within 3 days.]],TOKEN:'[id number]',TOKEN:'[id number]'

## 2017-07-12 NOTE — DISCHARGE NOTE ADULT - CARE PLAN
Principal Discharge DX:	Transient cerebral ischemia, unspecified type  Goal:	avoid in future  Instructions for follow-up, activity and diet:	follow with PCP/neurology  take medicines as directed.  Secondary Diagnosis:	Chronic obstructive pulmonary disease, unspecified COPD type  Instructions for follow-up, activity and diet:	continue home medications.  Secondary Diagnosis:	Benign prostatic hyperplasia without lower urinary tract symptoms, unspecified morphology  Instructions for follow-up, activity and diet:	continue home medications.  Secondary Diagnosis:	Essential hypertension  Instructions for follow-up, activity and diet:	continue home medications  Secondary Diagnosis:	Hemangioma  Instructions for follow-up, activity and diet:	follow with PCP for further work up and management for "8 mm lucent lesion in right frontal calvarium on CT head" (?hemangioma)  Secondary Diagnosis:	Stenosis of left carotid artery  Instructions for follow-up, activity and diet:	follow with Vascular surgeon for outpatient surveillance.  take aspirin and statin. Principal Discharge DX:	Transient cerebral ischemia, unspecified type  Goal:	avoid in future  Instructions for follow-up, activity and diet:	follow with PCP/neurology  take medicines as directed.  Secondary Diagnosis:	Chronic obstructive pulmonary disease, unspecified COPD type  Instructions for follow-up, activity and diet:	continue home medications.  Secondary Diagnosis:	Benign prostatic hyperplasia without lower urinary tract symptoms, unspecified morphology  Instructions for follow-up, activity and diet:	continue home medications.  Secondary Diagnosis:	Essential hypertension  Instructions for follow-up, activity and diet:	continue home medications  Secondary Diagnosis:	Hemangioma  Instructions for follow-up, activity and diet:	follow with PCP for further work up and management for "8 mm lucent lesion in right frontal calvarium on CT head" (?hemangioma)  Secondary Diagnosis:	Stenosis of left carotid artery  Instructions for follow-up, activity and diet:	follow with Vascular surgeon for outpatient surveillance.  take aspirin and statin.  Secondary Diagnosis:	PAF (paroxysmal atrial fibrillation)  Instructions for follow-up, activity and diet:	as per discussion with cardiology will not start anticoagulation as he is not an good candidate for AC given fall risk and elderly, take aspirin daily and follow with PCP and cardiology as outpatient.  You are already on Toprol for rate control.

## 2017-07-12 NOTE — H&P ADULT - PROBLEM SELECTOR PLAN 1
Admit to   Head CT w/o contrast  Neuro checks   B/L carotid dopplers   Physical therapy   Safety/ Fall precautions  Neurology consult Dr. Barreto Admit to telemetry  Head CT w/o contrast  Neuro checks   B/L carotid dopplers   Physical therapy   Safety/ Fall precautions  Neurology consult Dr. Barreto Admit to telemetry  Head CT w/o contrast  Neuro checks   B/L carotid dopplers   Physical therapy   Safety/ Fall precautions  Neurology consult Dr. Barreto  dysphagia screening Admit to telemetry  Head CT w/o contrast  Neuro checks   B/L carotid dopplers   Physical therapy   Safety/ Fall precautions  Neurology consult Dr. Bourne  dysphagia screening

## 2017-07-12 NOTE — DISCHARGE NOTE ADULT - PLAN OF CARE
avoid in future follow with PCP/neurology  take medicines as directed. continue home medications. continue home medications follow with Vascular surgeon for outpatient surveillance.  take aspirin and statin. follow with PCP for further work up and management for "8 mm lucent lesion in right frontal calvarium on CT head" (?hemangioma) as per discussion with cardiology will not start anticoagulation as he is not an good candidate for AC given fall risk and elderly, take aspirin daily and follow with PCP and cardiology as outpatient.  You are already on Toprol for rate control.

## 2017-07-12 NOTE — ED PROVIDER NOTE - OBJECTIVE STATEMENT
Pertinent PMH/PSH/FHx/SHx and Review of Systems contained within:  88 yo M bib ems as AMS, upgraded to code stroke on presentation.  As per EMS, pt. found unresponsive in chair.  They tried to wake him up, and he wouldn't wake up--difficult to arouse.  Pt. is normally communicating and moving well.  Pt. is responsive to his name, but makes no meaningful verbal communication.  EMS thought maybe he was favoring moving his left side, but that's unclear at this time.  No other complaints, no other hx provided by EMS.  Family aware of patient's condition; they were present in the home when EMS arrived.  ROS: unobtainable due to pt. condition  PMH: unobtainable from patient; Meds: unobtainable from patient; SH: unobtainable from patient Pertinent PMH/PSH/FHx/SHx and Review of Systems contained within:  88 yo M bib ems as AMS, upgraded to code stroke on presentation.  As per EMS, pt. found unresponsive in chair.  They tried to wake him up, and he wouldn't wake up--difficult to arouse.  Pt. is normally communicating and moving well.  Pt. is responsive to his name, but makes no meaningful verbal communication.  EMS thought maybe he was favoring moving his left side, but that's unclear at this time.  No other complaints, no other hx provided by EMS.  Family aware of patient's condition; they were present in the home when EMS arrived.  Called A for pt's wife, she says pt. was making strange sounds and foaming from mouth earlier. hx below from [facility]  ROS: unobtainable due to pt. condition  PMH: unobtainable from patient; Meds: tamsulosin hcl 4mg, finasteride 5m, theophylline 400 mg, montelukast 10 mg, metoprolol 50 mg, furosemide 40 mg; lisinopril 40 mg, docusate, spiriva, advair, proair; SH: unobtainable from patient Pertinent PMH/PSH/FHx/SHx and Review of Systems contained within:  86 yo M bib ems as AMS, upgraded to code stroke on presentation.  As per EMS, pt. found unresponsive in chair.  They tried to wake him up, and he wouldn't wake up--difficult to arouse.  Pt. is normally communicating and moving well.  Pt. is responsive to his name, but makes no meaningful verbal communication.  EMS thought maybe he was favoring moving his left side, but that's unclear at this time.  No other complaints, no other hx provided by EMS.  Family aware of patient's condition; they were present in the home when EMS arrived.  Called A for pt's wife, she says pt. was making strange sounds and foaming from mouth earlier. hx below from [facility]  ROS: unobtainable due to pt. condition  PMH: copd, hx of TIA in past, copd; Meds: tamsulosin hcl 4mg, finasteride 5m, theophylline 400 mg, montelukast 10 mg, metoprolol 50 mg, furosemide 40 mg; lisinopril 40 mg, docusate, spiriva, advair, proair; SH: unobtainable from patient

## 2017-07-13 DIAGNOSIS — Z95.0 PRESENCE OF CARDIAC PACEMAKER: ICD-10-CM

## 2017-07-13 DIAGNOSIS — G45.9 TRANSIENT CEREBRAL ISCHEMIC ATTACK, UNSPECIFIED: ICD-10-CM

## 2017-07-13 LAB
ANION GAP SERPL CALC-SCNC: 9 MMOL/L — SIGNIFICANT CHANGE UP (ref 5–17)
BUN SERPL-MCNC: 14 MG/DL — SIGNIFICANT CHANGE UP (ref 7–23)
CALCIUM SERPL-MCNC: 9 MG/DL — SIGNIFICANT CHANGE UP (ref 8.5–10.1)
CHLORIDE SERPL-SCNC: 107 MMOL/L — SIGNIFICANT CHANGE UP (ref 96–108)
CO2 SERPL-SCNC: 26 MMOL/L — SIGNIFICANT CHANGE UP (ref 22–31)
CREAT SERPL-MCNC: 1.06 MG/DL — SIGNIFICANT CHANGE UP (ref 0.5–1.3)
CULTURE RESULTS: SIGNIFICANT CHANGE UP
GLUCOSE SERPL-MCNC: 94 MG/DL — SIGNIFICANT CHANGE UP (ref 70–99)
HCT VFR BLD CALC: 31.6 % — LOW (ref 39–50)
HGB BLD-MCNC: 11.2 G/DL — LOW (ref 13–17)
MCHC RBC-ENTMCNC: 30.5 PG — SIGNIFICANT CHANGE UP (ref 27–34)
MCHC RBC-ENTMCNC: 35.4 GM/DL — SIGNIFICANT CHANGE UP (ref 32–36)
MCV RBC AUTO: 86.2 FL — SIGNIFICANT CHANGE UP (ref 80–100)
PLATELET # BLD AUTO: 191 K/UL — SIGNIFICANT CHANGE UP (ref 150–400)
POTASSIUM SERPL-MCNC: 3.4 MMOL/L — LOW (ref 3.5–5.3)
POTASSIUM SERPL-SCNC: 3.4 MMOL/L — LOW (ref 3.5–5.3)
RBC # BLD: 3.66 M/UL — LOW (ref 4.2–5.8)
RBC # FLD: 14.3 % — SIGNIFICANT CHANGE UP (ref 11–15)
SODIUM SERPL-SCNC: 142 MMOL/L — SIGNIFICANT CHANGE UP (ref 135–145)
SPECIMEN SOURCE: SIGNIFICANT CHANGE UP
WBC # BLD: 7.9 K/UL — SIGNIFICANT CHANGE UP (ref 3.8–10.5)
WBC # FLD AUTO: 7.9 K/UL — SIGNIFICANT CHANGE UP (ref 3.8–10.5)

## 2017-07-13 PROCEDURE — 93306 TTE W/DOPPLER COMPLETE: CPT | Mod: 26

## 2017-07-13 PROCEDURE — 93880 EXTRACRANIAL BILAT STUDY: CPT | Mod: 26

## 2017-07-13 PROCEDURE — 99232 SBSQ HOSP IP/OBS MODERATE 35: CPT

## 2017-07-13 PROCEDURE — 99233 SBSQ HOSP IP/OBS HIGH 50: CPT

## 2017-07-13 RX ORDER — POTASSIUM CHLORIDE 20 MEQ
40 PACKET (EA) ORAL ONCE
Qty: 0 | Refills: 0 | Status: COMPLETED | OUTPATIENT
Start: 2017-07-13 | End: 2017-07-13

## 2017-07-13 RX ORDER — ASPIRIN/CALCIUM CARB/MAGNESIUM 324 MG
81 TABLET ORAL DAILY
Qty: 0 | Refills: 0 | Status: DISCONTINUED | OUTPATIENT
Start: 2017-07-13 | End: 2017-07-14

## 2017-07-13 RX ORDER — ATORVASTATIN CALCIUM 80 MG/1
20 TABLET, FILM COATED ORAL AT BEDTIME
Qty: 0 | Refills: 0 | Status: DISCONTINUED | OUTPATIENT
Start: 2017-07-13 | End: 2017-07-14

## 2017-07-13 RX ADMIN — HEPARIN SODIUM 5000 UNIT(S): 5000 INJECTION INTRAVENOUS; SUBCUTANEOUS at 06:31

## 2017-07-13 RX ADMIN — HEPARIN SODIUM 5000 UNIT(S): 5000 INJECTION INTRAVENOUS; SUBCUTANEOUS at 18:23

## 2017-07-13 RX ADMIN — MONTELUKAST 10 MILLIGRAM(S): 4 TABLET, CHEWABLE ORAL at 22:14

## 2017-07-13 RX ADMIN — TIOTROPIUM BROMIDE 1 CAPSULE(S): 18 CAPSULE ORAL; RESPIRATORY (INHALATION) at 22:16

## 2017-07-13 RX ADMIN — LATANOPROST 1 DROP(S): 0.05 SOLUTION/ DROPS OPHTHALMIC; TOPICAL at 22:13

## 2017-07-13 RX ADMIN — Medication 81 MILLIGRAM(S): at 18:23

## 2017-07-13 RX ADMIN — TAMSULOSIN HYDROCHLORIDE 0.4 MILLIGRAM(S): 0.4 CAPSULE ORAL at 22:14

## 2017-07-13 RX ADMIN — Medication 100 MILLIGRAM(S): at 06:31

## 2017-07-13 RX ADMIN — Medication 40 MILLIEQUIVALENT(S): at 10:33

## 2017-07-13 RX ADMIN — SODIUM CHLORIDE 100 MILLILITER(S): 9 INJECTION, SOLUTION INTRAVENOUS at 22:12

## 2017-07-13 RX ADMIN — FINASTERIDE 5 MILLIGRAM(S): 5 TABLET, FILM COATED ORAL at 11:02

## 2017-07-13 RX ADMIN — LISINOPRIL 40 MILLIGRAM(S): 2.5 TABLET ORAL at 06:31

## 2017-07-13 RX ADMIN — Medication 50 MILLIGRAM(S): at 06:31

## 2017-07-13 RX ADMIN — ATORVASTATIN CALCIUM 20 MILLIGRAM(S): 80 TABLET, FILM COATED ORAL at 22:20

## 2017-07-13 NOTE — SWALLOW BEDSIDE ASSESSMENT ADULT - ASR SWALLOW DENTITION
upper dentures/lower dentures lower dentures/upper dentures/Dentures must be worn during po intake of regular consistency foods.

## 2017-07-13 NOTE — CONSULT NOTE ADULT - SUBJECTIVE AND OBJECTIVE BOX
Subjective Complaints:  Historian:       Consult requested by ER doctor:                  Attending: Dr Castañeda    HPI:  87yr old male PMH HTN, TIA, PPM, BPH, COPD, BIBA by ambulance with AMS, upgraded to code stroke upon presentation. As per EMS pt. was found unresponsive in chair, difficult to arouse. Family was present in the home when EMS arrived as per [facility] for pt.'s wife she says pt. was making strange sounds and foaming at the mouth earlier. Pt. normally is AxOx2-3 communicates and moves well. Upon arrival to the ER pt. was sleepy, arousable to pain, mumbling with no meaningful communication. Pt. is now responsive  AxOx1-2, speech is clear.  Pt. denies chest pain, SOB, palpitations, N&V, chills, fever.    Head w/o contrast revealed no acute intracranial hemorrhage or acute territorial infarct.   MRI is contraindicated due to PMH of PPM (2017 12:18)    LUPE NUÑEZ    PAST MEDICAL & SURGICAL HISTORY:  Glaucoma  TIA (transient ischemic attack)  COPD (chronic obstructive pulmonary disease)  BPH (benign prostatic hyperplasia)  HTN (hypertension)  Cardiac pacemaker  87yMale    MEDICATIONS  (STANDING):  heparin  Injectable 5000 Unit(s) SubCutaneous every 12 hours  finasteride 5 milliGRAM(s) Oral daily  lisinopril 40 milliGRAM(s) Oral daily  tamsulosin 0.4 milliGRAM(s) Oral at bedtime  metoprolol succinate ER 50 milliGRAM(s) Oral daily  tiotropium 18 MICROgram(s) Capsule 1 Capsule(s) Inhalation daily  docusate sodium 100 milliGRAM(s) Oral two times a day  montelukast 10 milliGRAM(s) Oral daily  latanoprost 0.005% Ophthalmic Solution 1 Drop(s) Both EYES at bedtime  theophylline SR Tablet 400 milliGRAM(s) Oral daily  dextrose 5% + sodium chloride 0.9%. 1000 milliLiter(s) (100 mL/Hr) IV Continuous <Continuous>    MEDICATIONS  (PRN):  ALBUTerol    90 MICROgram(s) HFA Inhaler 2 Puff(s) Inhalation every 6 hours PRN Shortness of Breath and/or Wheezing      Allergies    No Known Allergies    Intolerances      FAMILY HISTORY:      REVIEW OF SYSTEMS:  General:  No wt loss, fevers, chills, night sweats  Eyes:  Good vision, no reported pain  ENT:  No sore throat, pain, runny nose, dysphagia  CV:  No pain, palpitatioins, hypo/hypertension  Resp:  No dyspnea, cough, tachypnea, wheezing  GI:  No pain, nausea, vomiting, diarrhea, constipatiion  :  No pain, bleeding, incontinence, nocturia  Muscle:  No pain, weakness  Breast:  No pain, abscess, mass, discharge  Neuro:  No weakness, tingling, memory problems  Psych:  No fatigue, insomnia, mood problems, depression  Endocrine:  No polyuria, polydypsia, cold/heat intolerance  Heme:  No petechiae, ecchymosis, easy bruisability  Skin:  No rash, tattoos, scars, edema      Vital Signs Last 24 Hrs  T(C): 36.8 (2017 05:37), Max: 36.8 (2017 05:37)  T(F): 98.2 (2017 05:37), Max: 98.2 (2017 05:37)  HR: 63 (2017 08:45) (60 - 63)  BP: 117/55 (2017 08:45) (117/55 - 124/52)  BP(mean): --  RR: 18 (2017 05:37) (18 - 18)  SpO2: 97% (2017 08:45) (97% - 98%)    GENERAL PHYSICAL EXAM:  General:  Appears stated age, well-groomed, well-nourished, no distress  HEENT:  NC/AT, patent nares w/ pink mucosa, OP clear w/o lesions, PERRL, EOMI, conjunctivae clear, no thyromegaly, nodules, adenopathy, no JVD  Chest:  Full & symmetric excursion, no increased effort, breath sounds clear  Cardiovascular:  Regular rhythm, S1, S2, no murmur/rub/S3/S4, no carotid/femoral/abdominal bruit, radial/pedal pulses 2+, no edema  Abdomen:  Soft, non-tender, non-distended, normoactive bowel sounds, no HSM  Extremities:  Gait & station:   Digits:   Nails:   Joints, Bones, Muscles:   ROM:   Stability:  Skin:  No rash/erythema/ecchymoses/petechiae/wounds/abscess/warm/dry  Musculoskeletal:  Full ROM in all joints w/o swelling/tenderness/effusion    NEUROLOGICAL EXAM:  HENT:  Normocephalic head; atraumatic head.  Neck supple.  ENT: normal looking.  Mental State:    Alert.  Fully oriented to person, place and date.  Coherent.  Speech clear and intact.  Cooperative.  Responds appropriately.    Cranial Nerves:  II-XII:   Pupils round and reactive to light and accommodation.  Extraocular movements full.  Visual fields full (no homonymous hemianopsia).  Visual acuity wnl.  Facial symmetry intact.  Tongue midline.  Motor Functions:  Moves all extremities.  No pronator drift of UE.  Claps hand well.  Hand  intact bilaterally.  Ambulatory.    Sensory Functions:   Intact to touch and pinprick to face and extremities.    Reflexes:  Deep tendon reflexes normoactive to biceps, knees and ankles.  Babinski absent (present).  Cerebellar Testing:    Finger to nose intact.  Nystagmus absent.  Gait : unremarkable    LABS:                        11.2   7.9   )-----------( 191      ( 2017 09:12 )             31.6         142  |  107  |  14  ----------------------------<  94  3.4<L>   |  26  |  1.06    Ca    9.0      2017 09:12    TPro  7.4  /  Alb  3.5  /  TBili  0.4  /  DBili  x   /  AST  16  /  ALT  15  /  AlkPhos  110  07-12    PT/INR - ( 2017 08:13 )   PT: 10.9 sec;   INR: 1.00 ratio             Urinalysis Basic - ( 2017 08:43 )    Color: Yellow / Appearance: Clear / S.005 / pH: x  Gluc: x / Ketone: Negative  / Bili: Negative / Urobili: Negative mg/dL   Blood: x / Protein: 15 mg/dL / Nitrite: Negative   Leuk Esterase: Negative / RBC: 6-10 /HPF / WBC x   Sq Epi: x / Non Sq Epi: x / Bacteria: x        RADIOLOGY & ADDITIONAL STUDIES:    Diet, Regular ( @ 09:20)  potassium chloride    Tablet ER:   40 milliEquivalent(s), Oral, once, Stop After 1 Doses  Administration Instructions: swallow whole * don't crush/chew  Provider's Contact #: [phone] ( @ 09:40)  Remote Telemetry/EKG INCLUDING Off Unit Tests:     Time/Priority:  Routine ( @ 09:41)      Assessment & Opinion:Tia     Recommendations:  .  Carotid doppler.  Echocardiogram.  EEG.   DVT prophylaxis as ordered.  Medications:  ASA, statin

## 2017-07-13 NOTE — PROGRESS NOTE ADULT - PROBLEM SELECTOR PLAN 1
Admit to telemetry  neurology consult appreciated   B/L carotid dopplers   Physical therapy   Safety/ Fall precautions  Neurology consult Dr. Bourne  dysphagia screening

## 2017-07-13 NOTE — PHYSICAL THERAPY INITIAL EVALUATION ADULT - IMPAIRMENTS FOUND, PT EVAL
gross motor/fine motor/aerobic capacity/endurance/posture/gait, locomotion, and balance/muscle strength/ergonomics and body mechanics

## 2017-07-13 NOTE — PHYSICAL THERAPY INITIAL EVALUATION ADULT - PLANNED THERAPY INTERVENTIONS, PT EVAL
strengthening/neuromuscular re-education/gait training/bed mobility training/postural re-education/transfer training/lumbar stabilization/manual therapy techniques/balance training

## 2017-07-13 NOTE — SWALLOW BEDSIDE ASSESSMENT ADULT - SWALLOW EVAL: PATIENT/FAMILY GOALS STATEMENT
Pt reported his desire for po intake. He stated he typically eats "whatever" but prefers "chicken and vegetables".

## 2017-07-13 NOTE — PROGRESS NOTE ADULT - SUBJECTIVE AND OBJECTIVE BOX
Patient is a 87y old  Male who presents with a chief complaint of "I just passed out". (12 Jul 2017 17:50)      PAST MEDICAL & SURGICAL HISTORY:  Glaucoma  TIA (transient ischemic attack)  COPD (chronic obstructive pulmonary disease)  BPH (benign prostatic hyperplasia)  HTN (hypertension)  Cardiac pacemaker      INTERVAL HISTORY: Resting in bed, not in any acute disttress  	  MEDICATIONS:  MEDICATIONS  (STANDING):  heparin  Injectable 5000 Unit(s) SubCutaneous every 12 hours  finasteride 5 milliGRAM(s) Oral daily  lisinopril 40 milliGRAM(s) Oral daily  tamsulosin 0.4 milliGRAM(s) Oral at bedtime  metoprolol succinate ER 50 milliGRAM(s) Oral daily  tiotropium 18 MICROgram(s) Capsule 1 Capsule(s) Inhalation daily  docusate sodium 100 milliGRAM(s) Oral two times a day  montelukast 10 milliGRAM(s) Oral daily  latanoprost 0.005% Ophthalmic Solution 1 Drop(s) Both EYES at bedtime  theophylline SR Tablet 400 milliGRAM(s) Oral daily  dextrose 5% + sodium chloride 0.9%. 1000 milliLiter(s) (100 mL/Hr) IV Continuous <Continuous>    MEDICATIONS  (PRN):  ALBUTerol    90 MICROgram(s) HFA Inhaler 2 Puff(s) Inhalation every 6 hours PRN Shortness of Breath and/or Wheezing    Vitals:  T(F): 98.2 (07-13-17 @ 05:37), Max: 98.2 (07-13-17 @ 05:37)  HR: 63 (07-13-17 @ 08:45) (60 - 63)  BP: 117/55 (07-13-17 @ 08:45) (117/55 - 127/68)  RR: 18 (07-13-17 @ 05:37) (18 - 18)  SpO2: 97% (07-13-17 @ 08:45) (97% - 98%)    07-12 @ 07:01  -  07-13 @ 07:00  --------------------------------------------------------  IN:    dextrose 5% + sodium chloride 0.9%.: 100 mL  Total IN: 100 mL    OUT:    Voided: 1050 mL  Total OUT: 1050 mL    Total NET: -950 mL    Weight (kg): 72.1 (07-12 @ 15:14)  BMI (kg/m2): 24.2 (07-12 @ 15:14)    PHYSICAL EXAM:  Neuro: Awake, responsive  CV: S1 S2 RRR  Lungs: CTABL  GI: Soft, BS +, ND, NT  Extremities: No edema    TELEMETRY: paced  	    RADIOLOGY: < from: CT Brain Stroke Protocol (07.12.17 @ 08:01) >  No acute intracranial hemorrhage or acute territorial infarct. If acute   ischemia is a strong clinical concern, MRI exam is recommended for   further evaluation if there is no contraindication.    8 mm nonspecific lucent lesion right frontal calvarium to small to   characterize could represent hemangioma. Nonemergent MRI exam can be   obtained for further evaluation to exclude other neoplastic lesions.    < end of copied text >  < from: US Duplex Carotid Arteries Complete, Bilateral (07.13.17 @ 13:52) >   No hemodynamically significant stenosis in the right internal   carotid artery by velocity criteria.    Moderate stenosis (50-69%) in the left internal carotid artery by   velocity criteria.      < end of copied text >      DIAGNOSTIC TESTING:    [p ] Echocardiogram:     LABS:	 	    CARDIAC MARKERS:  Troponin I, Serum: <.015 ng/mL (07-12 @ 08:13)    13 Jul 2017 09:12    142    |  107    |  14     ----------------------------<  94     3.4     |  26     |  1.06   12 Jul 2017 08:13    142    |  107    |  15     ----------------------------<  145    3.7     |  23     |  1.47     Ca    9.0        13 Jul 2017 09:12    TPro  7.4    /  Alb  3.5    /  TBili  0.4    /  DBili  x      /  AST  16     /  ALT  15     /  AlkPhos  110    12 Jul 2017 08:13                          11.2   7.9   )-----------( 191      ( 13 Jul 2017 09:12 )             31.6 ,                       12.3   10.4  )-----------( 210      ( 12 Jul 2017 08:13 )             34.7           INR: 1.00 ratio (07-12 @ 08:13) Patient is a 87y old  Male who presents with a chief complaint of "I just passed out". (12 Jul 2017 17:50)      PAST MEDICAL & SURGICAL HISTORY:  Glaucoma  TIA (transient ischemic attack)  COPD (chronic obstructive pulmonary disease)  BPH (benign prostatic hyperplasia)  HTN (hypertension)  Cardiac pacemaker  Removal of  mass from heart 30-40 years ago      INTERVAL HISTORY: Resting in bed, not in any acute distress, states " I am back to normal," anxious to go home. denies any dizziness, chest pain or sob.  	  MEDICATIONS:  MEDICATIONS  (STANDING):  heparin  Injectable 5000 Unit(s) SubCutaneous every 12 hours  finasteride 5 milliGRAM(s) Oral daily  lisinopril 40 milliGRAM(s) Oral daily  tamsulosin 0.4 milliGRAM(s) Oral at bedtime  metoprolol succinate ER 50 milliGRAM(s) Oral daily  tiotropium 18 MICROgram(s) Capsule 1 Capsule(s) Inhalation daily  docusate sodium 100 milliGRAM(s) Oral two times a day  montelukast 10 milliGRAM(s) Oral daily  latanoprost 0.005% Ophthalmic Solution 1 Drop(s) Both EYES at bedtime  theophylline SR Tablet 400 milliGRAM(s) Oral daily  dextrose 5% + sodium chloride 0.9%. 1000 milliLiter(s) (100 mL/Hr) IV Continuous <Continuous>    MEDICATIONS  (PRN):  ALBUTerol    90 MICROgram(s) HFA Inhaler 2 Puff(s) Inhalation every 6 hours PRN Shortness of Breath and/or Wheezing    Vitals:  T(F): 98.2 (07-13-17 @ 05:37), Max: 98.2 (07-13-17 @ 05:37)  HR: 63 (07-13-17 @ 08:45) (60 - 63)  BP: 117/55 (07-13-17 @ 08:45) (117/55 - 127/68)  RR: 18 (07-13-17 @ 05:37) (18 - 18)  SpO2: 97% (07-13-17 @ 08:45) (97% - 98%)    07-12 @ 07:01  -  07-13 @ 07:00  --------------------------------------------------------  IN:    dextrose 5% + sodium chloride 0.9%.: 100 mL  Total IN: 100 mL    OUT:    Voided: 1050 mL  Total OUT: 1050 mL    Total NET: -950 mL    Weight (kg): 72.1 (07-12 @ 15:14)  BMI (kg/m2): 24.2 (07-12 @ 15:14)    PHYSICAL EXAM:  Neuro: Awake, responsive  CV: S1 S2 RRR , + systolic murmur  Lungs: CTABL  GI: Soft, BS +, ND, NT  Extremities: No edema    TELEMETRY: paced  	    RADIOLOGY: < from: CT Brain Stroke Protocol (07.12.17 @ 08:01) >  No acute intracranial hemorrhage or acute territorial infarct. If acute   ischemia is a strong clinical concern, MRI exam is recommended for   further evaluation if there is no contraindication.    8 mm nonspecific lucent lesion right frontal calvarium to small to   characterize could represent hemangioma. Nonemergent MRI exam can be   obtained for further evaluation to exclude other neoplastic lesions.    < end of copied text >  < from: US Duplex Carotid Arteries Complete, Bilateral (07.13.17 @ 13:52) >   No hemodynamically significant stenosis in the right internal   carotid artery by velocity criteria.    Moderate stenosis (50-69%) in the left internal carotid artery by   velocity criteria.      < end of copied text >      DIAGNOSTIC TESTING:    [p ] Echocardiogram:     LABS:	 	    CARDIAC MARKERS:  Troponin I, Serum: <.015 ng/mL (07-12 @ 08:13)    13 Jul 2017 09:12    142    |  107    |  14     ----------------------------<  94     3.4     |  26     |  1.06   12 Jul 2017 08:13    142    |  107    |  15     ----------------------------<  145    3.7     |  23     |  1.47     Ca    9.0        13 Jul 2017 09:12    TPro  7.4    /  Alb  3.5    /  TBili  0.4    /  DBili  x      /  AST  16     /  ALT  15     /  AlkPhos  110    12 Jul 2017 08:13                          11.2   7.9   )-----------( 191      ( 13 Jul 2017 09:12 )             31.6 ,                       12.3   10.4  )-----------( 210      ( 12 Jul 2017 08:13 )             34.7           INR: 1.00 ratio (07-12 @ 08:13)

## 2017-07-13 NOTE — SWALLOW BEDSIDE ASSESSMENT ADULT - COMMENTS
CXR 7-: IMPRESSION: Cannot exclude mild interstitial edema changes as above. No discrete bilateral focal infiltrates.    CT BRAIN 7-: IMPRESSION: No acute intracranial hemorrhage or acute territorial infarct. If acute ischemia is a strong clinical concern, MRI exam is recommended for further evaluation if there is no contraindication. 8 mm nonspecific lucent lesion right frontal calvarium to small to characterize could represent hemangioma. Nonemergent MRI exam can be obtained for further evaluation to exclude other neoplastic lesions. CXR 7-: IMPRESSION: Cannot exclude mild interstitial edema changes as above. No discrete bilateral focal infiltrates.    CT BRAIN 7-: IMPRESSION: No acute intracranial hemorrhage or acute territorial infarct. If acute ischemia is a strong clinical concern, MRI exam is recommended for further evaluation if there is no contraindication. 8 mm nonspecific lucent lesion right frontal calvarium to small to characterize could represent hemangioma.

## 2017-07-13 NOTE — SWALLOW BEDSIDE ASSESSMENT ADULT - SWALLOW EVAL: RECOMMENDED FEEDING/EATING TECHNIQUES
position upright (90 degrees)/allow for swallow between intakes position upright (90 degrees)/allow for swallow between intakes/Dentures must be worn during po intake of regular consistency foods.

## 2017-07-13 NOTE — SWALLOW BEDSIDE ASSESSMENT ADULT - H & P REVIEW
87yr old male PMH HTN, TIA, PPM, BPH, COPD, BIBA by ambulance with AMS, upgraded to code stroke upon presentation. As per EMS pt. was found unresponsive in chair, difficult to arouse. Family was present in the home when EMS arrived as per HHA for pt.'s wife she says pt. was making strange sounds and foaming at the mouth earlier. Pt. normally is AxOx2-3 communicates and moves well. Upon arrival to the ER pt. was sleepy, arousable to pain, mumbling with no meaningful communication. Pt. is now responsive  AxOx1-2, speech is clear.  Pt. denies chest pain, SOB, palpitations, N&V, chills, fever./yes

## 2017-07-13 NOTE — PROGRESS NOTE ADULT - PROBLEM SELECTOR PLAN 1
CT head with no acute findings  Neuro f/u  c/w telemetry  Unresponsive episode at home, will interrogate PPM  PT, speech eval  Echo pending  carotid duplex: Moderate stenosis (50-69%) in the left internal carotid artery CT head with no acute findings  Neuro f/u  c/w telemetry  Unresponsive episode at home, will interrogate [employer], [employer] contacted.  PT, speech eval  Echo pending  carotid duplex: Moderate stenosis (50-69%) in the left internal carotid artery

## 2017-07-13 NOTE — PROGRESS NOTE ADULT - SUBJECTIVE AND OBJECTIVE BOX
CHIEF COMPLAINT/INTERVAL HISTORY:    Patient is a 87y old  Male who presents with a chief complaint of "I just passed out". (2017 17:50)      HPI:  87yr old male PMH HTN, TIA, PPM, BPH, COPD, BIBA by ambulance with AMS, upgraded to code stroke upon presentation. As per EMS pt. was found unresponsive in chair, difficult to arouse. Family was present in the home when EMS arrived as per HHA for pt.'s wife she says pt. was making strange sounds and foaming at the mouth earlier. Pt. normally is AxOx2-3 communicates and moves well. Upon arrival to the ER pt. was sleepy, arousable to pain, mumbling with no meaningful communication. Pt. is now responsive  AxOx1-2, speech is clear.  Pt. denies chest pain, SOB, palpitations, N&V, chills, fever.    Head w/o contrast revealed no acute intracranial hemorrhage or acute territorial infarct.   MRI is contraindicated due to PMH of PPM (2017 12:18)    Overnight issues  none   cardiology/neuro consults appreciated  PPM needs to be interrograted   SUBJECTIVE & OBJECTIVE: Pt seen and examined at bedside.   ROS:  CONSTITUTIONAL: No fever, weight loss, or fatigue  NECK: No pain or stiffness  RESPIRATORY: No cough, wheezing, chills or hemoptysis; No shortness of breath  CARDIOVASCULAR: No chest pain, palpitations, dizziness, or leg swelling  GASTROINTESTINAL: No abdominal or epigastric pain. No nausea, vomiting, or hematemesis; No diarrhea or constipation. No melena or hematochezia.  GENITOURINARY: No dysuria, frequency, hematuria, or incontinence  NEUROLOGICAL: No headaches, memory loss, loss of strength, numbness, or tremors  SKIN: No itching, burning, rashes, or lesions   ICU Vital Signs Last 24 Hrs  T(C): 36.9 (2017 17:42), Max: 36.9 (2017 17:42)  T(F): 98.4 (2017 17:42), Max: 98.4 (2017 17:42)  HR: 63 (2017 17:42) (60 - 63)  BP: 136/55 (2017 17:42) (117/55 - 136/55)  BP(mean): --  ABP: --  ABP(mean): --  RR: 18 (2017 17:42) (18 - 18)  SpO2: 98% (2017 17:42) (97% - 98%)        MEDICATIONS  (STANDING):  heparin  Injectable 5000 Unit(s) SubCutaneous every 12 hours  finasteride 5 milliGRAM(s) Oral daily  lisinopril 40 milliGRAM(s) Oral daily  tamsulosin 0.4 milliGRAM(s) Oral at bedtime  metoprolol succinate ER 50 milliGRAM(s) Oral daily  tiotropium 18 MICROgram(s) Capsule 1 Capsule(s) Inhalation daily  docusate sodium 100 milliGRAM(s) Oral two times a day  montelukast 10 milliGRAM(s) Oral daily  latanoprost 0.005% Ophthalmic Solution 1 Drop(s) Both EYES at bedtime  theophylline SR Tablet 400 milliGRAM(s) Oral daily  dextrose 5% + sodium chloride 0.9%. 1000 milliLiter(s) (100 mL/Hr) IV Continuous <Continuous>  atorvastatin 20 milliGRAM(s) Oral at bedtime  aspirin  chewable 81 milliGRAM(s) Oral daily    MEDICATIONS  (PRN):  ALBUTerol    90 MICROgram(s) HFA Inhaler 2 Puff(s) Inhalation every 6 hours PRN Shortness of Breath and/or Wheezing        PHYSICAL EXAM:    GENERAL: NAD, well-groomed, well-developed  HEAD:  Atraumatic, Normocephalic  EYES: EOMI, PERRLA, conjunctiva and sclera clear  ENMT: Moist mucous membranes  NECK: Supple, No JVD  NERVOUS SYSTEM:  Alert & Oriented X3, Motor Strength 5/5 B/L upper and lower extremities; DTRs 2+ intact and symmetric  CHEST/LUNG: Clear to auscultation bilaterally; No rales, rhonchi, wheezing, or rubs  HEART: Regular rate and rhythm; No murmurs, rubs, or gallops  ABDOMEN: Soft, Nontender, Nondistended; Bowel sounds present  EXTREMITIES:  2+ Peripheral Pulses, No clubbing, cyanosis, or edema    LABS:                        11.2   7.9   )-----------( 191      ( 2017 09:12 )             31.6     07-13    142  |  107  |  14  ----------------------------<  94  3.4<L>   |  26  |  1.06    Ca    9.0      2017 09:12    TPro  7.4  /  Alb  3.5  /  TBili  0.4  /  DBili  x   /  AST  16  /  ALT  15  /  AlkPhos  110  07-12    PT/INR - ( 2017 08:13 )   PT: 10.9 sec;   INR: 1.00 ratio           Urinalysis Basic - ( 2017 08:43 )    Color: Yellow / Appearance: Clear / S.005 / pH: x  Gluc: x / Ketone: Negative  / Bili: Negative / Urobili: Negative mg/dL   Blood: x / Protein: 15 mg/dL / Nitrite: Negative   Leuk Esterase: Negative / RBC: 6-10 /HPF / WBC x   Sq Epi: x / Non Sq Epi: x / Bacteria: x        CAPILLARY BLOOD GLUCOSE          RECENT CULTURES:      RADIOLOGY & ADDITIONAL TESTS:  Imaging Personally Reviewed:  [ ] YES      Consultant(s) Notes Reviewed:  [ ] YES     Care Discussed with [ ] Consultants [X ] Patient [ ] Family  [x ]    [x ]  Other; RN  HEALTH ISSUES - PROBLEM Dx:  TIA (transient ischemic attack): TIA (transient ischemic attack)  BPH (benign prostatic hyperplasia): BPH (benign prostatic hyperplasia)  COPD (chronic obstructive pulmonary disease): COPD (chronic obstructive pulmonary disease)  Elevated serum creatinine: Elevated serum creatinine  HTN (hypertension): HTN (hypertension)        DVT/GI ppx  Discussed with pt @ bedside

## 2017-07-13 NOTE — PHYSICAL THERAPY INITIAL EVALUATION ADULT - GAIT DEVIATIONS NOTED, PT EVAL
increased stride width/decreased weight-shifting ability/decreased stride length/decreased step length/decreased steve

## 2017-07-13 NOTE — PHYSICAL THERAPY INITIAL EVALUATION ADULT - ADDITIONAL COMMENTS
Pt lives with wife (who has alzheimer's disease). Pt's wife has a home health aide. Pt has multiple flights of stairs in home- all with rails. Pt owns a cane but does not use.

## 2017-07-13 NOTE — PROGRESS NOTE ADULT - PROBLEM SELECTOR PLAN 2
Continue metoprolol 50mg, lisinopril 40mg   2D-echo results pending   telemetry monitoring  Cardiology consult Dr. Ga

## 2017-07-13 NOTE — PROGRESS NOTE ADULT - ASSESSMENT
87yr old male PMH HTN, TIA, PPM, BPH, COPD, BIBA by ambulance with AMS, upgraded to code stroke upon presentation. As per EMS, pt. was found unresponsive in chair, difficult to arouse.  pt. was making strange sounds and foaming at the mouth earlier. Pt. normally is AxOx2-3 communicates and moves well. Upon arrival to the ER pt. was sleepy, arousable to pain, mumbling with no meaningful communication. Pt. is now responsive  AxOx1-2, speech is clear.  Head w/o contrast revealed no acute intracranial hemorrhage or acute territorial infarct. 87yr old male PMH HTN, TIA, PPM, BPH, COPD, BIBA by ambulance with AMS, upgraded to code stroke upon presentation. As per EMS, pt. was found unresponsive in chair, difficult to arouse.  pt. was making strange sounds and foaming at the mouth earlier. Pt. normally is AxOx2-3 communicates and moves well. Upon arrival to the ER pt. was sleepy, arousable to pain, mumbling with no meaningful communication. Pt. is now responsive  AxOx1-2, speech is clear.  Head w/o contrast revealed no acute intracranial hemorrhage or acute territorial infarct. patient has PPM, however patient does not know when it was implanted, or when it was last interrogated. PPM will be interrogated prior to discharge, [facility] [employer] contacted. Echo pending. carotid duplex: Moderate stenosis (50-69%) in the left internal carotid artery

## 2017-07-13 NOTE — SWALLOW BEDSIDE ASSESSMENT ADULT - SWALLOW EVAL: DIAGNOSIS
Pt presented with oropharyngeal phases of swallow grossly within functional limits. No overt signs of aspiration at this time.

## 2017-07-14 VITALS
TEMPERATURE: 99 F | OXYGEN SATURATION: 97 % | HEART RATE: 60 BPM | RESPIRATION RATE: 18 BRPM | SYSTOLIC BLOOD PRESSURE: 135 MMHG | DIASTOLIC BLOOD PRESSURE: 57 MMHG

## 2017-07-14 LAB
ANION GAP SERPL CALC-SCNC: 6 MMOL/L — SIGNIFICANT CHANGE UP (ref 5–17)
BUN SERPL-MCNC: 18 MG/DL — SIGNIFICANT CHANGE UP (ref 7–23)
CALCIUM SERPL-MCNC: 9.5 MG/DL — SIGNIFICANT CHANGE UP (ref 8.5–10.1)
CHLORIDE SERPL-SCNC: 108 MMOL/L — SIGNIFICANT CHANGE UP (ref 96–108)
CHOLEST SERPL-MCNC: 239 MG/DL — HIGH (ref 10–199)
CO2 SERPL-SCNC: 27 MMOL/L — SIGNIFICANT CHANGE UP (ref 22–31)
CREAT SERPL-MCNC: 1.16 MG/DL — SIGNIFICANT CHANGE UP (ref 0.5–1.3)
GLUCOSE SERPL-MCNC: 100 MG/DL — HIGH (ref 70–99)
HBA1C BLD-MCNC: 5.7 % — HIGH (ref 4–5.6)
HDLC SERPL-MCNC: 54 MG/DL — SIGNIFICANT CHANGE UP (ref 40–125)
LIPID PNL WITH DIRECT LDL SERPL: 164 MG/DL — HIGH
MAGNESIUM SERPL-MCNC: 2.3 MG/DL — SIGNIFICANT CHANGE UP (ref 1.6–2.6)
POTASSIUM SERPL-MCNC: 4 MMOL/L — SIGNIFICANT CHANGE UP (ref 3.5–5.3)
POTASSIUM SERPL-SCNC: 4 MMOL/L — SIGNIFICANT CHANGE UP (ref 3.5–5.3)
SODIUM SERPL-SCNC: 141 MMOL/L — SIGNIFICANT CHANGE UP (ref 135–145)
TOTAL CHOLESTEROL/HDL RATIO MEASUREMENT: 4.4 RATIO — SIGNIFICANT CHANGE UP (ref 3.4–9.6)
TRIGL SERPL-MCNC: 106 MG/DL — SIGNIFICANT CHANGE UP (ref 10–149)

## 2017-07-14 PROCEDURE — 99232 SBSQ HOSP IP/OBS MODERATE 35: CPT

## 2017-07-14 PROCEDURE — 99239 HOSP IP/OBS DSCHRG MGMT >30: CPT

## 2017-07-14 RX ORDER — ATORVASTATIN CALCIUM 80 MG/1
1 TABLET, FILM COATED ORAL
Qty: 30 | Refills: 0 | OUTPATIENT
Start: 2017-07-14 | End: 2017-08-13

## 2017-07-14 RX ORDER — ASPIRIN/CALCIUM CARB/MAGNESIUM 324 MG
1 TABLET ORAL
Qty: 30 | Refills: 0 | OUTPATIENT
Start: 2017-07-14 | End: 2017-08-13

## 2017-07-14 RX ADMIN — Medication 50 MILLIGRAM(S): at 06:21

## 2017-07-14 RX ADMIN — Medication 81 MILLIGRAM(S): at 11:37

## 2017-07-14 RX ADMIN — Medication 100 MILLIGRAM(S): at 06:20

## 2017-07-14 RX ADMIN — LISINOPRIL 40 MILLIGRAM(S): 2.5 TABLET ORAL at 06:21

## 2017-07-14 RX ADMIN — HEPARIN SODIUM 5000 UNIT(S): 5000 INJECTION INTRAVENOUS; SUBCUTANEOUS at 06:20

## 2017-07-14 RX ADMIN — FINASTERIDE 5 MILLIGRAM(S): 5 TABLET, FILM COATED ORAL at 11:35

## 2017-07-14 NOTE — PROGRESS NOTE ADULT - SUBJECTIVE AND OBJECTIVE BOX
Patient is a 87y old  Male who presents with a chief complaint of "I just passed out". (12 Jul 2017 17:50)      PAST MEDICAL & SURGICAL HISTORY:  Glaucoma  TIA (transient ischemic attack)  COPD (chronic obstructive pulmonary disease)  BPH (benign prostatic hyperplasia)  HTN (hypertension)  Cardiac pacemaker      INTERVAL HISTORY: Resting in bed, not in any acute distress, no complaints, anxious to go home  	  MEDICATIONS:  MEDICATIONS  (STANDING):  heparin  Injectable 5000 Unit(s) SubCutaneous every 12 hours  finasteride 5 milliGRAM(s) Oral daily  lisinopril 40 milliGRAM(s) Oral daily  tamsulosin 0.4 milliGRAM(s) Oral at bedtime  metoprolol succinate ER 50 milliGRAM(s) Oral daily  tiotropium 18 MICROgram(s) Capsule 1 Capsule(s) Inhalation daily  docusate sodium 100 milliGRAM(s) Oral two times a day  montelukast 10 milliGRAM(s) Oral daily  latanoprost 0.005% Ophthalmic Solution 1 Drop(s) Both EYES at bedtime  dextrose 5% + sodium chloride 0.9%. 1000 milliLiter(s) (100 mL/Hr) IV Continuous <Continuous>  atorvastatin 20 milliGRAM(s) Oral at bedtime  aspirin  chewable 81 milliGRAM(s) Oral daily  theophylline 200 milliGram ER Capsule 400 milliGRAM(s) 400 milliGRAM(s) Oral <User Schedule>    MEDICATIONS  (PRN):  ALBUTerol    90 MICROgram(s) HFA Inhaler 2 Puff(s) Inhalation every 6 hours PRN Shortness of Breath and/or Wheezing      Vitals:  T(F): 98.8 (07-14-17 @ 06:02), Max: 98.8 (07-14-17 @ 06:02)  HR: 60 (07-14-17 @ 06:02) (60 - 63)  BP: 150/60 (07-14-17 @ 06:02) (136/55 - 150/60)  RR: 18 (07-14-17 @ 06:02) (18 - 18)  SpO2: 97% (07-14-17 @ 06:02) (97% - 98%)    07-13 @ 07:01  -  07-14 @ 07:00  --------------------------------------------------------  IN:    Oral Fluid: 360 mL  Total IN: 360 mL    OUT:    Voided: 650 mL  Total OUT: 650 mL    Total NET: -290 mL      07-14 @ 07:01  -  07-14 @ 11:05  --------------------------------------------------------  IN:  Total IN: 0 mL    OUT:    Voided: 1 mL  Total OUT: 1 mL    Total NET: -1 mL      Weight (kg): 72.1 (07-12 @ 15:14)  BMI (kg/m2): 24.2 (07-12 @ 15:14)      PHYSICAL EXAM:  Neuro: Awake, responsive  CV: S1 S2 RRR, soft systolic murmur  Lungs: CTABL  GI: Soft, BS +, ND, NT  Extremities: No edema    TELEMETRY: paced with PVCs  	    RADIOLOGY: < from: Xray Chest 1 View AP/PA. (07.12.17 @ 08:55) >  Cannot exclude mild interstitial edema changes as above.  No discrete bilateral focal infiltrates.    < end of copied text >      DIAGNOSTIC TESTING:    [x ] Echocardiogram   < from: TTE Echo Doppler w/o Cont (07.13.17 @ 12:20) >  1. Left ventricular ejection fraction, byvisual estimation, is 60 to   65%.   2. Normal global left ventricular systolic function.   3. Normal left ventricular internal cavity size.   4. Normal right ventricular size and function.   5. There is no evidence of pericardial effusion.   6. Mildmitral annular calcification.   7. Mild mitral valve regurgitation.   8. Thickening and calcification of the anterior and posterior mitral   valve leaflets.   9. Sclerotic aortic valve with normal opening.  10. Mildly enlarged left atrium.  11. Thereis mild aortic root calcification.    < end of copied text >    LABS:	 	    CARDIAC MARKERS:  Troponin I, Serum: <.015 ng/mL (07-12 @ 08:13)    13 Jul 2017 09:12    142    |  107    |  14     ----------------------------<  94     3.4     |  26     |  1.06   12 Jul 2017 08:13    142    |  107    |  15     ----------------------------<  145    3.7     |  23     |  1.47     Ca    9.0        13 Jul 2017 09:12                            11.2   7.9   )-----------( 191      ( 13 Jul 2017 09:12 )             31.6 ,                       12.3   10.4  )-----------( 210      ( 12 Jul 2017 08:13 )             34.7       INR: 1.00 ratio (07-12 @ 08:13) Patient is a 87y old  Male who presents with a chief complaint of "I just passed out". (12 Jul 2017 17:50)      PAST MEDICAL & SURGICAL HISTORY:  Glaucoma  TIA (transient ischemic attack)  COPD (chronic obstructive pulmonary disease)  BPH (benign prostatic hyperplasia)  HTN (hypertension)  Cardiac pacemaker      INTERVAL HISTORY: Resting in bed, not in any acute distress, no complaints, anxious to go home  	  MEDICATIONS:  MEDICATIONS  (STANDING):  heparin  Injectable 5000 Unit(s) SubCutaneous every 12 hours  finasteride 5 milliGRAM(s) Oral daily  lisinopril 40 milliGRAM(s) Oral daily  tamsulosin 0.4 milliGRAM(s) Oral at bedtime  metoprolol succinate ER 50 milliGRAM(s) Oral daily  tiotropium 18 MICROgram(s) Capsule 1 Capsule(s) Inhalation daily  docusate sodium 100 milliGRAM(s) Oral two times a day  montelukast 10 milliGRAM(s) Oral daily  latanoprost 0.005% Ophthalmic Solution 1 Drop(s) Both EYES at bedtime  dextrose 5% + sodium chloride 0.9%. 1000 milliLiter(s) (100 mL/Hr) IV Continuous <Continuous>  atorvastatin 20 milliGRAM(s) Oral at bedtime  aspirin  chewable 81 milliGRAM(s) Oral daily  theophylline 200 milliGram ER Capsule 400 milliGRAM(s) 400 milliGRAM(s) Oral <User Schedule>    MEDICATIONS  (PRN):  ALBUTerol    90 MICROgram(s) HFA Inhaler 2 Puff(s) Inhalation every 6 hours PRN Shortness of Breath and/or Wheezing      Vitals:  T(F): 98.8 (07-14-17 @ 06:02), Max: 98.8 (07-14-17 @ 06:02)  HR: 60 (07-14-17 @ 06:02) (60 - 63)  BP: 150/60 (07-14-17 @ 06:02) (136/55 - 150/60)  RR: 18 (07-14-17 @ 06:02) (18 - 18)  SpO2: 97% (07-14-17 @ 06:02) (97% - 98%)    07-13 @ 07:01  -  07-14 @ 07:00  --------------------------------------------------------  IN:    Oral Fluid: 360 mL  Total IN: 360 mL    OUT:    Voided: 650 mL  Total OUT: 650 mL    Total NET: -290 mL      07-14 @ 07:01  -  07-14 @ 11:05  --------------------------------------------------------  IN:  Total IN: 0 mL    OUT:    Voided: 1 mL  Total OUT: 1 mL    Total NET: -1 mL      Weight (kg): 72.1 (07-12 @ 15:14)  BMI (kg/m2): 24.2 (07-12 @ 15:14)      PHYSICAL EXAM:  Neuro: Awake, responsive  CV: S1 S2 RRR, soft systolic murmur  Lungs: CTABL  GI: Soft, BS +, ND, NT  Extremities: No edema    TELEMETRY: paced with PVCs  	    RADIOLOGY: < from: Xray Chest 1 View AP/PA. (07.12.17 @ 08:55) >  Cannot exclude mild interstitial edema changes as above.  No discrete bilateral focal infiltrates.    < end of copied text >      DIAGNOSTIC TESTING:    [x ] Echocardiogram   < from: TTE Echo Doppler w/o Cont (07.13.17 @ 12:20) >  1. Left ventricular ejection fraction, byvisual estimation, is 60 to   65%.   2. Normal global left ventricular systolic function.   3. Normal left ventricular internal cavity size.   4. Normal right ventricular size and function.   5. There is no evidence of pericardial effusion.   6. Mildmitral annular calcification.   7. Mild mitral valve regurgitation.   8. Thickening and calcification of the anterior and posterior mitral   valve leaflets.   9. Sclerotic aortic valve with normal opening.  10. Mildly enlarged left atrium.  11. Thereis mild aortic root calcification.    < end of copied text >    LABS:	 	    CARDIAC MARKERS:  Troponin I, Serum: <.015 ng/mL (07-12 @ 08:13)    13 Jul 2017 09:12    142    |  107    |  14     ----------------------------<  94     3.4     |  26     |  1.06   12 Jul 2017 08:13    142    |  107    |  15     ----------------------------<  145    3.7     |  23     |  1.47     Ca    9.0        13 Jul 2017 09:12                            11.2   7.9   )-----------( 191      ( 13 Jul 2017 09:12 )             31.6 ,                       12.3   10.4  )-----------( 210      ( 12 Jul 2017 08:13 )             34.7       INR: 1.00 ratio (07-12 @ 08:13)      < from: TTE Echo Doppler w/o Cont (07.13.17 @ 12:20) >   Summary:   1. Left ventricular ejection fraction, byvisual estimation, is 60 to   65%.   2. Normal global left ventricular systolic function.   3. Normal left ventricular internal cavity size.   4. Normal right ventricular size and function.   5. There is no evidence of pericardial effusion.   6. Mildmitral annular calcification.   7. Mild mitral valve regurgitation.   8. Thickening and calcification of the anterior and posterior mitral   valve leaflets.   9. Sclerotic aortic valve with normal opening.  10. Mildly enlarged left atrium.  11. Thereis mild aortic root calcification.    < end of copied text > Patient is a 87y old  Male who presents with a chief complaint of "I just passed out". (12 Jul 2017 17:50)      PAST MEDICAL & SURGICAL HISTORY:  Glaucoma  TIA (transient ischemic attack)  COPD (chronic obstructive pulmonary disease)  BPH (benign prostatic hyperplasia)  HTN (hypertension)  Cardiac pacemaker      INTERVAL HISTORY: Resting in bed, not in any acute distress, no complaints, anxious to go home  	  MEDICATIONS:  MEDICATIONS  (STANDING):  heparin  Injectable 5000 Unit(s) SubCutaneous every 12 hours  finasteride 5 milliGRAM(s) Oral daily  lisinopril 40 milliGRAM(s) Oral daily  tamsulosin 0.4 milliGRAM(s) Oral at bedtime  metoprolol succinate ER 50 milliGRAM(s) Oral daily  tiotropium 18 MICROgram(s) Capsule 1 Capsule(s) Inhalation daily  docusate sodium 100 milliGRAM(s) Oral two times a day  montelukast 10 milliGRAM(s) Oral daily  latanoprost 0.005% Ophthalmic Solution 1 Drop(s) Both EYES at bedtime  dextrose 5% + sodium chloride 0.9%. 1000 milliLiter(s) (100 mL/Hr) IV Continuous <Continuous>  atorvastatin 20 milliGRAM(s) Oral at bedtime  aspirin  chewable 81 milliGRAM(s) Oral daily  theophylline 200 milliGram ER Capsule 400 milliGRAM(s) 400 milliGRAM(s) Oral <User Schedule>    MEDICATIONS  (PRN):  ALBUTerol    90 MICROgram(s) HFA Inhaler 2 Puff(s) Inhalation every 6 hours PRN Shortness of Breath and/or Wheezing      Vitals:  Vital Signs Last 24 Hrs  T(C): 37 (14 Jul 2017 10:45), Max: 37.1 (14 Jul 2017 06:02)  T(F): 98.6 (14 Jul 2017 10:45), Max: 98.8 (14 Jul 2017 06:02)  HR: 62 (14 Jul 2017 10:45) (60 - 63)  BP: 112/53 (14 Jul 2017 10:45) (112/53 - 150/60)  RR: 18 (14 Jul 2017 10:45) (18 - 18)  SpO2: 97% (14 Jul 2017 10:45) (97% - 98%)        PHYSICAL EXAM:  Neuro: Awake, responsive  CV: S1 S2 RRR, soft systolic murmur  Lungs: CTABL  GI: Soft, BS +, ND, NT  Extremities: No edema    TELEMETRY: paced with PVCs  	    RADIOLOGY: < from: Xray Chest 1 View AP/PA. (07.12.17 @ 08:55) >  Cannot exclude mild interstitial edema changes as above.  No discrete bilateral focal infiltrates.    < end of copied text >      DIAGNOSTIC TESTING:    [x ] Echocardiogram   < from: TTE Echo Doppler w/o Cont (07.13.17 @ 12:20) >  1. Left ventricular ejection fraction, byvisual estimation, is 60 to   65%.   2. Normal global left ventricular systolic function.   3. Normal left ventricular internal cavity size.   4. Normal right ventricular size and function.   5. There is no evidence of pericardial effusion.   6. Mildmitral annular calcification.   7. Mild mitral valve regurgitation.   8. Thickening and calcification of the anterior and posterior mitral   valve leaflets.   9. Sclerotic aortic valve with normal opening.  10. Mildly enlarged left atrium.  11. Thereis mild aortic root calcification.    < end of copied text >    LABS:	 	    CARDIAC MARKERS:  Troponin I, Serum: <.015 ng/mL (07-12 @ 08:13)    13 Jul 2017 09:12                          11.2   7.9   )-----------( 191      ( 13 Jul 2017 09:12 )             31.6     07-14    141  |  108  |  18  ----------------------------<  100<H>  4.0   |  27  |  1.16    Ca    9.5      14 Jul 2017 11:13  Mg     2.3     07-14          < from: TTE Echo Doppler w/o Cont (07.13.17 @ 12:20) >   Summary:   1. Left ventricular ejection fraction, byvisual estimation, is 60 to   65%.   2. Normal global left ventricular systolic function.   3. Normal left ventricular internal cavity size.   4. Normal right ventricular size and function.   5. There is no evidence of pericardial effusion.   6. Mildmitral annular calcification.   7. Mild mitral valve regurgitation.   8. Thickening and calcification of the anterior and posterior mitral   valve leaflets.   9. Sclerotic aortic valve with normal opening.  10. Mildly enlarged left atrium.  11. Thereis mild aortic root calcification.    < end of copied text >

## 2017-07-14 NOTE — PROGRESS NOTE ADULT - PROBLEM SELECTOR PLAN 3
c/w treatment for COPD management   clinically stable
Daily BMP   Hold lasix for now   Gentle hydration
c/w treatment for COPD management   clinically stable

## 2017-07-14 NOTE — PROGRESS NOTE ADULT - PROBLEM SELECTOR PROBLEM 4
BPH (benign prostatic hyperplasia)
COPD (chronic obstructive pulmonary disease)
BPH (benign prostatic hyperplasia)

## 2017-07-14 NOTE — PROGRESS NOTE ADULT - PROBLEM SELECTOR PLAN 1
CT head with no acute findings  Neuro f/u  c/w telemetry  Unresponsive episode at home,  PT, speech eval  Echo with preserved EF  carotid duplex: Moderate stenosis (50-69%) in the left internal carotid artery  asa/statin  PPM interrogated by [employer] this am: Biventricular pacer, Multiple events of PAF. ? need for A/C CT head with no acute findings  Neuro f/u  c/w telemetry  Unresponsive episode at home,  PT, speech eval  Echo with preserved EF  carotid duplex: Moderate stenosis (50-69%) in the left internal carotid artery  asa/statin  PPM interrogated by [employer] this am: Biventricular pacer, Multiple events of PAF. Asa 81 mg daily ok. Not a good AC candidate.

## 2017-07-14 NOTE — PROGRESS NOTE ADULT - ASSESSMENT
87yr old male PMH HTN, TIA, PPM, BPH, COPD, BIBA by ambulance with AMS, upgraded to code stroke upon presentation. As per EMS, pt. was found unresponsive in chair, difficult to arouse.  pt. was making strange sounds and foaming at the mouth earlier. Pt. normally is AxOx2-3 communicates and moves well. Upon arrival to the ER pt. was sleepy, arousable to pain, mumbling with no meaningful communication. Pt. is now responsive  AxOx1-2, speech is clear.  Head w/o contrast revealed no acute intracranial hemorrhage or acute territorial infarct. patient has PPM, however patient does not know when it was implanted, or when it was last interrogated. PPM interrogated by [employer], + Biventricular pacer, multiple events of PAF noted as per Rep.  Echo with preserved EF carotid duplex: Moderate stenosis (50-69%) in the left internal carotid artery. 87yr old male PMH HTN, TIA, PPM, BPH, COPD, BIBA by ambulance with AMS, upgraded to code stroke upon presentation. As per EMS, pt. was found unresponsive in chair, difficult to arouse.  pt. was making strange sounds and foaming at the mouth earlier. Pt. normally is AxOx2-3 communicates and moves well. Upon arrival to the ER pt. was sleepy, arousable to pain, mumbling with no meaningful communication. Pt. is now responsive  AxOx1-2, speech is clear.  Head w/o contrast revealed no acute intracranial hemorrhage or acute territorial infarct. patient has PPM, however patient does not know when it was implanted, or when it was last interrogated. PPM interrogated by [employer], + Biventricular pacer, multiple events of PAF noted as per Rep.  Echo with preserved EF carotid duplex: Moderate stenosis (50-69%) in the left internal carotid artery. Not a good AC candidate. Aspirin ok at this point.

## 2017-07-19 DIAGNOSIS — L89.151 PRESSURE ULCER OF SACRAL REGION, STAGE 1: ICD-10-CM

## 2017-07-19 DIAGNOSIS — Z86.73 PERSONAL HISTORY OF TRANSIENT ISCHEMIC ATTACK (TIA), AND CEREBRAL INFARCTION WITHOUT RESIDUAL DEFICITS: ICD-10-CM

## 2017-07-19 DIAGNOSIS — R55 SYNCOPE AND COLLAPSE: ICD-10-CM

## 2017-07-19 DIAGNOSIS — H40.9 UNSPECIFIED GLAUCOMA: ICD-10-CM

## 2017-07-19 DIAGNOSIS — J44.9 CHRONIC OBSTRUCTIVE PULMONARY DISEASE, UNSPECIFIED: ICD-10-CM

## 2017-07-19 DIAGNOSIS — G45.9 TRANSIENT CEREBRAL ISCHEMIC ATTACK, UNSPECIFIED: ICD-10-CM

## 2017-07-19 DIAGNOSIS — N40.0 BENIGN PROSTATIC HYPERPLASIA WITHOUT LOWER URINARY TRACT SYMPTOMS: ICD-10-CM

## 2017-07-19 DIAGNOSIS — D18.00 HEMANGIOMA UNSPECIFIED SITE: ICD-10-CM

## 2017-07-19 DIAGNOSIS — N17.9 ACUTE KIDNEY FAILURE, UNSPECIFIED: ICD-10-CM

## 2017-07-19 DIAGNOSIS — I10 ESSENTIAL (PRIMARY) HYPERTENSION: ICD-10-CM

## 2017-07-19 DIAGNOSIS — Z95.0 PRESENCE OF CARDIAC PACEMAKER: ICD-10-CM

## 2017-07-19 DIAGNOSIS — I65.22 OCCLUSION AND STENOSIS OF LEFT CAROTID ARTERY: ICD-10-CM

## 2017-07-19 DIAGNOSIS — I48.0 PAROXYSMAL ATRIAL FIBRILLATION: ICD-10-CM

## 2018-03-31 ENCOUNTER — INPATIENT (INPATIENT)
Facility: HOSPITAL | Age: 83
LOS: 1 days | Discharge: HOME | End: 2018-04-02
Attending: INTERNAL MEDICINE | Admitting: INTERNAL MEDICINE
Payer: MEDICARE

## 2018-03-31 VITALS
HEART RATE: 70 BPM | OXYGEN SATURATION: 98 % | WEIGHT: 169.98 LBS | TEMPERATURE: 97 F | DIASTOLIC BLOOD PRESSURE: 87 MMHG | SYSTOLIC BLOOD PRESSURE: 126 MMHG | HEIGHT: 69 IN | RESPIRATION RATE: 19 BRPM

## 2018-03-31 DIAGNOSIS — I63.9 CEREBRAL INFARCTION, UNSPECIFIED: ICD-10-CM

## 2018-03-31 DIAGNOSIS — H40.9 UNSPECIFIED GLAUCOMA: ICD-10-CM

## 2018-03-31 DIAGNOSIS — I10 ESSENTIAL (PRIMARY) HYPERTENSION: ICD-10-CM

## 2018-03-31 DIAGNOSIS — N40.1 BENIGN PROSTATIC HYPERPLASIA WITH LOWER URINARY TRACT SYMPTOMS: ICD-10-CM

## 2018-03-31 DIAGNOSIS — Z95.0 PRESENCE OF CARDIAC PACEMAKER: Chronic | ICD-10-CM

## 2018-03-31 DIAGNOSIS — J44.9 CHRONIC OBSTRUCTIVE PULMONARY DISEASE, UNSPECIFIED: ICD-10-CM

## 2018-03-31 LAB
ALBUMIN SERPL ELPH-MCNC: 3.8 G/DL — SIGNIFICANT CHANGE UP (ref 3.3–5)
ALP SERPL-CCNC: 104 U/L — SIGNIFICANT CHANGE UP (ref 40–120)
ALT FLD-CCNC: 31 U/L — SIGNIFICANT CHANGE UP (ref 12–78)
ANION GAP SERPL CALC-SCNC: 10 MMOL/L — SIGNIFICANT CHANGE UP (ref 5–17)
APPEARANCE UR: ABNORMAL
APTT BLD: 24.1 SEC — LOW (ref 27.5–37.4)
AST SERPL-CCNC: 21 U/L — SIGNIFICANT CHANGE UP (ref 15–37)
BACTERIA # UR AUTO: ABNORMAL
BASOPHILS # BLD AUTO: 0.04 K/UL — SIGNIFICANT CHANGE UP (ref 0–0.2)
BASOPHILS NFR BLD AUTO: 0.3 % — SIGNIFICANT CHANGE UP (ref 0–2)
BILIRUB SERPL-MCNC: 0.5 MG/DL — SIGNIFICANT CHANGE UP (ref 0.2–1.2)
BILIRUB UR-MCNC: NEGATIVE — SIGNIFICANT CHANGE UP
BLD GP AB SCN SERPL QL: SIGNIFICANT CHANGE UP
BUN SERPL-MCNC: 26 MG/DL — HIGH (ref 7–23)
CALCIUM SERPL-MCNC: 9.5 MG/DL — SIGNIFICANT CHANGE UP (ref 8.5–10.1)
CHLORIDE SERPL-SCNC: 105 MMOL/L — SIGNIFICANT CHANGE UP (ref 96–108)
CK MB BLD-MCNC: 1.8 % — SIGNIFICANT CHANGE UP (ref 0–3.5)
CK MB CFR SERPL CALC: 2.3 NG/ML — SIGNIFICANT CHANGE UP (ref 0.5–3.6)
CK SERPL-CCNC: 126 U/L — SIGNIFICANT CHANGE UP (ref 26–308)
CO2 SERPL-SCNC: 24 MMOL/L — SIGNIFICANT CHANGE UP (ref 22–31)
COLOR SPEC: YELLOW — SIGNIFICANT CHANGE UP
COMMENT - URINE: SIGNIFICANT CHANGE UP
CREAT SERPL-MCNC: 1.48 MG/DL — HIGH (ref 0.5–1.3)
DIFF PNL FLD: NEGATIVE — SIGNIFICANT CHANGE UP
EOSINOPHIL # BLD AUTO: 0.36 K/UL — SIGNIFICANT CHANGE UP (ref 0–0.5)
EOSINOPHIL NFR BLD AUTO: 3 % — SIGNIFICANT CHANGE UP (ref 0–6)
EPI CELLS # UR: SIGNIFICANT CHANGE UP
GLUCOSE BLDC GLUCOMTR-MCNC: 156 MG/DL — HIGH (ref 70–99)
GLUCOSE SERPL-MCNC: 180 MG/DL — HIGH (ref 70–99)
GLUCOSE UR QL: NEGATIVE MG/DL — SIGNIFICANT CHANGE UP
GRAN CASTS # UR COMP ASSIST: ABNORMAL /LPF
HCT VFR BLD CALC: 39.1 % — SIGNIFICANT CHANGE UP (ref 39–50)
HGB BLD-MCNC: 13.1 G/DL — SIGNIFICANT CHANGE UP (ref 13–17)
IMM GRANULOCYTES NFR BLD AUTO: 0.5 % — SIGNIFICANT CHANGE UP (ref 0–1.5)
INR BLD: 1.05 RATIO — SIGNIFICANT CHANGE UP (ref 0.88–1.16)
KETONES UR-MCNC: NEGATIVE — SIGNIFICANT CHANGE UP
LACTATE SERPL-SCNC: 2.9 MMOL/L — HIGH (ref 0.7–2)
LEUKOCYTE ESTERASE UR-ACNC: ABNORMAL
LYMPHOCYTES # BLD AUTO: 2.42 K/UL — SIGNIFICANT CHANGE UP (ref 1–3.3)
LYMPHOCYTES # BLD AUTO: 20.2 % — SIGNIFICANT CHANGE UP (ref 13–44)
MCHC RBC-ENTMCNC: 31.1 PG — SIGNIFICANT CHANGE UP (ref 27–34)
MCHC RBC-ENTMCNC: 33.5 GM/DL — SIGNIFICANT CHANGE UP (ref 32–36)
MCV RBC AUTO: 92.9 FL — SIGNIFICANT CHANGE UP (ref 80–100)
MONOCYTES # BLD AUTO: 0.69 K/UL — SIGNIFICANT CHANGE UP (ref 0–0.9)
MONOCYTES NFR BLD AUTO: 5.8 % — SIGNIFICANT CHANGE UP (ref 2–14)
NEUTROPHILS # BLD AUTO: 8.41 K/UL — HIGH (ref 1.8–7.4)
NEUTROPHILS NFR BLD AUTO: 70.2 % — SIGNIFICANT CHANGE UP (ref 43–77)
NITRITE UR-MCNC: NEGATIVE — SIGNIFICANT CHANGE UP
NRBC # BLD: 0 /100 WBCS — SIGNIFICANT CHANGE UP (ref 0–0)
PH UR: 7 — SIGNIFICANT CHANGE UP (ref 5–8)
PLATELET # BLD AUTO: 195 K/UL — SIGNIFICANT CHANGE UP (ref 150–400)
POTASSIUM SERPL-MCNC: 3.7 MMOL/L — SIGNIFICANT CHANGE UP (ref 3.5–5.3)
POTASSIUM SERPL-SCNC: 3.7 MMOL/L — SIGNIFICANT CHANGE UP (ref 3.5–5.3)
PROT SERPL-MCNC: 7.2 GM/DL — SIGNIFICANT CHANGE UP (ref 6–8.3)
PROT UR-MCNC: 30 MG/DL
PROTHROM AB SERPL-ACNC: 11.5 SEC — SIGNIFICANT CHANGE UP (ref 9.8–12.7)
RBC # BLD: 4.21 M/UL — SIGNIFICANT CHANGE UP (ref 4.2–5.8)
RBC # FLD: 14.2 % — SIGNIFICANT CHANGE UP (ref 10.3–14.5)
RBC CASTS # UR COMP ASSIST: SIGNIFICANT CHANGE UP /HPF (ref 0–4)
SODIUM SERPL-SCNC: 139 MMOL/L — SIGNIFICANT CHANGE UP (ref 135–145)
SP GR SPEC: 1.01 — SIGNIFICANT CHANGE UP (ref 1.01–1.02)
TROPONIN I SERPL-MCNC: <.015 NG/ML — SIGNIFICANT CHANGE UP (ref 0.01–0.04)
UROBILINOGEN FLD QL: NEGATIVE MG/DL — SIGNIFICANT CHANGE UP
WBC # BLD: 11.98 K/UL — HIGH (ref 3.8–10.5)
WBC # FLD AUTO: 11.98 K/UL — HIGH (ref 3.8–10.5)
WBC UR QL: ABNORMAL

## 2018-03-31 PROCEDURE — 93010 ELECTROCARDIOGRAM REPORT: CPT

## 2018-03-31 PROCEDURE — 70450 CT HEAD/BRAIN W/O DYE: CPT | Mod: 26

## 2018-03-31 PROCEDURE — 71045 X-RAY EXAM CHEST 1 VIEW: CPT | Mod: 26

## 2018-03-31 PROCEDURE — 99284 EMERGENCY DEPT VISIT MOD MDM: CPT | Mod: 25

## 2018-03-31 RX ORDER — SODIUM CHLORIDE 9 MG/ML
3 INJECTION INTRAMUSCULAR; INTRAVENOUS; SUBCUTANEOUS ONCE
Qty: 0 | Refills: 0 | Status: COMPLETED | OUTPATIENT
Start: 2018-03-31 | End: 2018-03-31

## 2018-03-31 RX ORDER — ASPIRIN/CALCIUM CARB/MAGNESIUM 324 MG
81 TABLET ORAL DAILY
Qty: 0 | Refills: 0 | Status: DISCONTINUED | OUTPATIENT
Start: 2018-03-31 | End: 2018-04-02

## 2018-03-31 RX ORDER — LATANOPROST 0.05 MG/ML
1 SOLUTION/ DROPS OPHTHALMIC; TOPICAL AT BEDTIME
Qty: 0 | Refills: 0 | Status: DISCONTINUED | OUTPATIENT
Start: 2018-03-31 | End: 2018-04-02

## 2018-03-31 RX ORDER — FUROSEMIDE 40 MG
1 TABLET ORAL
Qty: 0 | Refills: 0 | COMMUNITY

## 2018-03-31 RX ORDER — FINASTERIDE 5 MG/1
1 TABLET, FILM COATED ORAL
Qty: 0 | Refills: 0 | COMMUNITY

## 2018-03-31 RX ORDER — TIOTROPIUM BROMIDE 18 UG/1
1 CAPSULE ORAL; RESPIRATORY (INHALATION) DAILY
Qty: 0 | Refills: 0 | Status: DISCONTINUED | OUTPATIENT
Start: 2018-03-31 | End: 2018-04-02

## 2018-03-31 RX ORDER — ALBUTEROL 90 UG/1
1 AEROSOL, METERED ORAL EVERY 4 HOURS
Qty: 0 | Refills: 0 | Status: DISCONTINUED | OUTPATIENT
Start: 2018-03-31 | End: 2018-04-02

## 2018-03-31 RX ORDER — BUDESONIDE AND FORMOTEROL FUMARATE DIHYDRATE 160; 4.5 UG/1; UG/1
2 AEROSOL RESPIRATORY (INHALATION)
Qty: 0 | Refills: 0 | Status: DISCONTINUED | OUTPATIENT
Start: 2018-03-31 | End: 2018-04-02

## 2018-03-31 RX ORDER — METOPROLOL TARTRATE 50 MG
50 TABLET ORAL DAILY
Qty: 0 | Refills: 0 | Status: DISCONTINUED | OUTPATIENT
Start: 2018-03-31 | End: 2018-04-02

## 2018-03-31 RX ORDER — THEOPHYLLINE ANHYDROUS 200 MG
400 TABLET, EXTENDED RELEASE 12 HR ORAL DAILY
Qty: 0 | Refills: 0 | Status: DISCONTINUED | OUTPATIENT
Start: 2018-03-31 | End: 2018-04-02

## 2018-03-31 RX ORDER — FLUTICASONE PROPIONATE AND SALMETEROL 50; 250 UG/1; UG/1
1 POWDER ORAL; RESPIRATORY (INHALATION)
Qty: 0 | Refills: 0 | COMMUNITY

## 2018-03-31 RX ORDER — FUROSEMIDE 40 MG
40 TABLET ORAL DAILY
Qty: 0 | Refills: 0 | Status: DISCONTINUED | OUTPATIENT
Start: 2018-03-31 | End: 2018-04-02

## 2018-03-31 RX ORDER — ASPIRIN/CALCIUM CARB/MAGNESIUM 324 MG
300 TABLET ORAL ONCE
Qty: 0 | Refills: 0 | Status: COMPLETED | OUTPATIENT
Start: 2018-03-31 | End: 2018-03-31

## 2018-03-31 RX ORDER — TAMSULOSIN HYDROCHLORIDE 0.4 MG/1
0.4 CAPSULE ORAL AT BEDTIME
Qty: 0 | Refills: 0 | Status: DISCONTINUED | OUTPATIENT
Start: 2018-03-31 | End: 2018-04-02

## 2018-03-31 RX ORDER — FINASTERIDE 5 MG/1
5 TABLET, FILM COATED ORAL DAILY
Qty: 0 | Refills: 0 | Status: DISCONTINUED | OUTPATIENT
Start: 2018-03-31 | End: 2018-04-02

## 2018-03-31 RX ORDER — IPRATROPIUM/ALBUTEROL SULFATE 18-103MCG
3 AEROSOL WITH ADAPTER (GRAM) INHALATION EVERY 8 HOURS
Qty: 0 | Refills: 0 | Status: DISCONTINUED | OUTPATIENT
Start: 2018-03-31 | End: 2018-04-02

## 2018-03-31 RX ORDER — DOCUSATE SODIUM 100 MG
1 CAPSULE ORAL
Qty: 0 | Refills: 0 | COMMUNITY

## 2018-03-31 RX ORDER — MONTELUKAST 4 MG/1
10 TABLET, CHEWABLE ORAL DAILY
Qty: 0 | Refills: 0 | Status: DISCONTINUED | OUTPATIENT
Start: 2018-03-31 | End: 2018-04-02

## 2018-03-31 RX ORDER — DOCUSATE SODIUM 100 MG
100 CAPSULE ORAL
Qty: 0 | Refills: 0 | Status: DISCONTINUED | OUTPATIENT
Start: 2018-03-31 | End: 2018-04-02

## 2018-03-31 RX ORDER — TAMSULOSIN HYDROCHLORIDE 0.4 MG/1
1 CAPSULE ORAL
Qty: 0 | Refills: 0 | COMMUNITY

## 2018-03-31 RX ORDER — LISINOPRIL 2.5 MG/1
40 TABLET ORAL DAILY
Qty: 0 | Refills: 0 | Status: DISCONTINUED | OUTPATIENT
Start: 2018-03-31 | End: 2018-04-02

## 2018-03-31 RX ORDER — CEFTRIAXONE 500 MG/1
1 INJECTION, POWDER, FOR SOLUTION INTRAMUSCULAR; INTRAVENOUS ONCE
Qty: 0 | Refills: 0 | Status: COMPLETED | OUTPATIENT
Start: 2018-03-31 | End: 2018-03-31

## 2018-03-31 RX ORDER — BACITRACIN ZINC 500 UNIT/G
1 OINTMENT IN PACKET (EA) TOPICAL DAILY
Qty: 0 | Refills: 0 | Status: DISCONTINUED | OUTPATIENT
Start: 2018-03-31 | End: 2018-04-02

## 2018-03-31 RX ORDER — ATORVASTATIN CALCIUM 80 MG/1
20 TABLET, FILM COATED ORAL AT BEDTIME
Qty: 0 | Refills: 0 | Status: DISCONTINUED | OUTPATIENT
Start: 2018-03-31 | End: 2018-04-02

## 2018-03-31 RX ORDER — SODIUM CHLORIDE 9 MG/ML
500 INJECTION INTRAMUSCULAR; INTRAVENOUS; SUBCUTANEOUS ONCE
Qty: 0 | Refills: 0 | Status: COMPLETED | OUTPATIENT
Start: 2018-03-31 | End: 2018-03-31

## 2018-03-31 RX ADMIN — Medication 40 MILLIGRAM(S): at 16:11

## 2018-03-31 RX ADMIN — BUDESONIDE AND FORMOTEROL FUMARATE DIHYDRATE 2 PUFF(S): 160; 4.5 AEROSOL RESPIRATORY (INHALATION) at 18:17

## 2018-03-31 RX ADMIN — Medication 1 APPLICATION(S): at 12:11

## 2018-03-31 RX ADMIN — Medication 100 MILLIGRAM(S): at 18:17

## 2018-03-31 RX ADMIN — TAMSULOSIN HYDROCHLORIDE 0.4 MILLIGRAM(S): 0.4 CAPSULE ORAL at 21:03

## 2018-03-31 RX ADMIN — Medication 400 MILLIGRAM(S): at 12:11

## 2018-03-31 RX ADMIN — Medication 50 MILLIGRAM(S): at 16:21

## 2018-03-31 RX ADMIN — FINASTERIDE 5 MILLIGRAM(S): 5 TABLET, FILM COATED ORAL at 12:11

## 2018-03-31 RX ADMIN — CEFTRIAXONE 100 GRAM(S): 500 INJECTION, POWDER, FOR SOLUTION INTRAMUSCULAR; INTRAVENOUS at 09:12

## 2018-03-31 RX ADMIN — Medication 0.5 MILLIGRAM(S): at 09:09

## 2018-03-31 RX ADMIN — Medication 300 MILLIGRAM(S): at 09:13

## 2018-03-31 RX ADMIN — LISINOPRIL 40 MILLIGRAM(S): 2.5 TABLET ORAL at 16:11

## 2018-03-31 RX ADMIN — MONTELUKAST 10 MILLIGRAM(S): 4 TABLET, CHEWABLE ORAL at 12:11

## 2018-03-31 RX ADMIN — SODIUM CHLORIDE 500 MILLILITER(S): 9 INJECTION INTRAMUSCULAR; INTRAVENOUS; SUBCUTANEOUS at 09:09

## 2018-03-31 RX ADMIN — ATORVASTATIN CALCIUM 20 MILLIGRAM(S): 80 TABLET, FILM COATED ORAL at 21:03

## 2018-03-31 RX ADMIN — SODIUM CHLORIDE 3 MILLILITER(S): 9 INJECTION INTRAMUSCULAR; INTRAVENOUS; SUBCUTANEOUS at 08:42

## 2018-03-31 NOTE — ED PROVIDER NOTE - CARE PLAN
Principal Discharge DX:	CVA (cerebral vascular accident) Principal Discharge DX:	CVA (cerebral vascular accident)  Secondary Diagnosis:	Cellulitis

## 2018-03-31 NOTE — ED PROVIDER NOTE - MEDICAL DECISION MAKING DETAILS
Pt improved but still altered and not back to baseline. PT also with cellulitis to rt ant shoulder, ceftriaxone given, unlikely source of confusion. d/w dr. pastrana for admission.

## 2018-03-31 NOTE — H&P ADULT - NSHPLABSRESULTS_GEN_ALL_CORE
13.1   11.98 )-----------( 195      ( 31 Mar 2018 08:33 )             39.1         139  |  105  |  26<H>  ----------------------------<  180<H>  3.7   |  24  |  1.48<H>    Ca    9.5      31 Mar 2018 08:33    TPro  7.2  /  Alb  3.8  /  TBili  0.5  /  DBili  x   /  AST  21  /  ALT  31  /  AlkPhos  104      PT/INR - ( 31 Mar 2018 08:33 )   PT: 11.5 sec;   INR: 1.05 ratio         PTT - ( 31 Mar 2018 08:33 )  PTT:24.1 sec  Urinalysis Basic - ( 31 Mar 2018 09:27 )    Color: Yellow / Appearance: Slightly Turbid / S.010 / pH: x  Gluc: x / Ketone: Negative  / Bili: Negative / Urobili: Negative mg/dL   Blood: x / Protein: 30 mg/dL / Nitrite: Negative   Leuk Esterase: Trace / RBC: x / WBC x   Sq Epi: x / Non Sq Epi: x / Bacteria: x      CAPILLARY BLOOD GLUCOSE      POCT Blood Glucose.: 156 mg/dL (31 Mar 2018 07:45)      CARDIAC MARKERS ( 31 Mar 2018 08:33 )  <.015 ng/mL / x     / 126 U/L / x     / 2.3 ng/mL        Urine Culture:      Blood Culture:  < from: CT Head No Cont (18 @ 08:08) >      IMPRESSION:    No acute intracranial hemorrhage.    < end of copied text >

## 2018-03-31 NOTE — H&P ADULT - ASSESSMENT
87yo male with pmh HTN, TIA, PPM, BPH, COPD, presents with ams. Per EMS (and confirmed with aid over phone), last seen normal at 11pm last night by aid. Aid came in this morning noted pt was altered and not really responsive. Pt currently only opens eyes and grunts to name and trying to persistently button his shirt. Per aid, pt is AOx3 at baseline and still drives and ambulates independently. Pt has been otherwise normal per aid last night.   H/O TIA in the past, last admitted to [facility] in June. Admit bto Monitored bed. Spoke to the sons at bed side.Neuro consult.

## 2018-03-31 NOTE — ED ADULT NURSE NOTE - PMH
BPH (benign prostatic hyperplasia)    COPD (chronic obstructive pulmonary disease)    Glaucoma    HTN (hypertension)    TIA (transient ischemic attack)

## 2018-03-31 NOTE — ED PROVIDER NOTE - PHYSICAL EXAMINATION
Gen: Alert  Head: NC, AT, PERRL, EOMI, normal lids/conjunctiva   ENT: Bilateral TM WNL, normal hearing, patent oropharynx without erythema/exudate, uvula midline  Neck: supple, no tenderness/meningismus/JVD   Pulm: Bilateral clear BS, normal resp effort, no wheeze/stridor/retractions  CV: RRR, no M/R/G, +dist pulses   Abd: soft, NT/ND, +BS, no guarding/rebound tenderness  Mskel: no edema/erythema/cyanosis   Skin: no rash   Neuro: eyes open, more preferance to left, pulls back UE equally, RLE appears not as spontaneous as LLE Gen: Alert  Head: NC, AT, PERRL, EOMI, normal lids/conjunctiva   ENT: Bilateral TM WNL, normal hearing, patent oropharynx without erythema/exudate, uvula midline  Neck: supple, no tenderness/meningismus/JVD   Pulm: Bilateral clear BS, normal resp effort, no wheeze/stridor/retractions  CV: RRR, no M/R/G, +dist pulses   Abd: soft, NT/ND, +BS, no guarding/rebound tenderness  Mskel: no edema/erythema/cyanosis   Skin: rt ant shoulder/rt upper shallow ulcer, mild cellulits  Neuro: eyes open, more preferance to left, pulls back UE equally, RLE appears not as spontaneous as LLE

## 2018-03-31 NOTE — PATIENT PROFILE ADULT. - REASON FOR REFUSAL
NO, I don't like flu shot" as verbalized NO, I don't like flu shot" as verbalized, I get sick every time  I TOOKM IT

## 2018-03-31 NOTE — ED ADULT NURSE REASSESSMENT NOTE - GENERAL PATIENT STATE
comfortable appearance/sedated with ativan, had been calling out and confused earlier, shouting to close the door

## 2018-03-31 NOTE — ED PROVIDER NOTE - PROGRESS NOTE DETAILS
pt out of window for TPA pt improved from initial presentation. Pt still very confused, but now combative to "stay away", "leave me alone" in clear speech with more spontaneous movements of all 4 extremities.

## 2018-03-31 NOTE — H&P ADULT - NSHPPHYSICALEXAM_GEN_ALL_CORE
GENERAL: NAD, well-groomed, well-developed  HEAD:  Atraumatic, Normocephalic  EYES: EOMI, PERRLA, conjunctiva and sclera clear  ENMT:  Moist mucous membranes, Good dentition, No lesions  NECK: Supple, No JVD, Normal thyroid  NERVOUS SYSTEM:  Alert & Oriented X1,   ; Motor Strength 5/5 B/L upper and lower extremities; DTRs 2+ intact and symmetric  CHEST/LUNG: Clear to percussion bilaterally; No rales, rhonchi, wheezing, or rubs  HEART: Regular rate and rhythm; No murmurs, rubs, or gallops  ABDOMEN: Soft, Nontender, Nondistended; Bowel sounds present  EXTREMITIES:  2+ Peripheral Pulses, No clubbing, cyanosis, or edema  LYMPH: No lymphadenopathy noted  SKIN: No rashes or lesions

## 2018-03-31 NOTE — CONSULT NOTE ADULT - ASSESSMENT
Historian:   Patient (not reliable history).  ER notes also reviewed.    Brain CT scan:  Negative for acute pathology.  HPI:    LUPE NUÑEZ.  · HPI: 89yo male with pmh HTN, TIA, PPM, BPH, COPD, presents with Altered Mental State.   Per EMS (and confirmed with aid over phone), last seen normal at 11pm last night by aid. Aid came in this morning noted pt was altered and not really responsive. Pt currently only opens eyes and grunts to name and trying to persistently button his shirt. Per aid, pt is AOx3 at baseline and still drives and ambulates independently. Pt has been otherwise normal per aid last night.  H/O TIA in the past, last admitted to [facility] in . (31 Mar 2018 10:23)    PAST MEDICAL & SURGICAL HISTORY:  Glaucoma; TIA (transient ischemic attack); COPD (chronic obstructive pulmonary disease); BPH (benign prostatic hyperplasia); HTN (hypertension); Cardiac pacemaker    MEDICATIONS  (STANDING):  ALBUTerol    90 MICROgram(s) HFA Inhaler 1 Puff(s) Inhalation every 4 hours  aspirin  chewable 81 milliGRAM(s) Oral daily  atorvastatin 20 milliGRAM(s) Oral at bedtime  BACItracin   Ointment 1 Application(s) Topical daily  buDESOnide  80 MICROgram(s)/formoterol 4.5 MICROgram(s) Inhaler 2 Puff(s) Inhalation two times a day  docusate sodium 100 milliGRAM(s) Oral two times a day  finasteride 5 milliGRAM(s) Oral daily  furosemide    Tablet 40 milliGRAM(s) Oral daily  latanoprost 0.005% Ophthalmic Solution 1 Drop(s) Both EYES at bedtime  lisinopril 40 milliGRAM(s) Oral daily  metoprolol succinate ER 50 milliGRAM(s) Oral daily  montelukast 10 milliGRAM(s) Oral daily  tamsulosin 0.4 milliGRAM(s) Oral at bedtime  theophylline ER Capsule 400 milliGRAM(s) Oral daily  tiotropium 18 MICROgram(s) Capsule 1 Capsule(s) Inhalation daily    MEDICATIONS  (PRN):  ALBUTerol/ipratropium for Nebulization 3 milliLiter(s) Nebulizer every 8 hours PRN Bronchospasm    Allergies: fluoroquinolone antibiotics (Muscle Pain); statins (Muscle Pain; Joint Pain)  Intolerances  FAMILY HISTORY: No pertinent family history in first degree relatives    Vital Signs Last 24 Hrs  T(C): 36.6 (31 Mar 2018 16:10), Max: 36.6 (31 Mar 2018 16:09)  T(F): 97.9 (31 Mar 2018 16:10), Max: 97.9 (31 Mar 2018 16:09)  HR: 66 (31 Mar 2018 21:25) (60 - 70)  BP: 132/58 (31 Mar 2018 16:10) (126/87 - 146/69)  BP(mean): --  RR: 16 (31 Mar 2018 21:25) (16 - 21)  SpO2: 96% (31 Mar 2018 21:25) (95% - 98%)    NEUROLOGICAL EXAM:  HENT:  Normocephalic head; atraumatic head.  Neck supple.  ENT: normal looking.  Mental State:    Alert.  Fully oriented to person, place (hospital) and date (Friday though it's Saturday).  Coherent.  Speech clear and intact.  Cooperative.  Responds appropriately.  Cranial Nerves:   Pupils round and reactive to light.   Extraocular movements full.  Visual fields full (no homonymous hemianopsia).  Visual acuity wnl.  Facial symmetry intact.  Tongue midline.  Motor Functions:  Moves all extremities.  No pronator drift of UE.  Claps hands well.  Hand  intact bilaterally.      Sensory Functions:   Intact to touch and pinprick to face and extremities.    Reflexes:  Deep tendon reflexes normoactive to biceps, knees and ankles.  Babinski absent   Cerebellar Testing:    Finger to nose intact.  Nystagmus absent.  Neurovascular: Carotid auscultation full without bruits.      LABS:                        13.1   11.98 )-----------( 195      ( 31 Mar 2018 08:33 )             39.1     03    139  |  105  |  26<H>  ----------------------------<  180<H>  3.7   |  24  |  1.48<H>    Ca    9.5      31 Mar 2018 08:33    TPro  7.2  /  Alb  3.8  /  TBili  0.5  /  DBili  x   /  AST  21  /  ALT  31  /  AlkPhos  104  03-31    PT/INR - ( 31 Mar 2018 08:33 )   PT: 11.5 sec;   INR: 1.05 ratio         PTT - ( 31 Mar 2018 08:33 )  PTT:24.1 sec    Urinalysis Basic - ( 31 Mar 2018 09:27 )    Color: Yellow / Appearance: Slightly Turbid / S.010 / pH: x  Gluc: x / Ketone: Negative  / Bili: Negative / Urobili: Negative mg/dL   Blood: x / Protein: 30 mg/dL / Nitrite: Negative   Leuk Esterase: Trace / RBC: 0-2 /HPF / WBC 6-10   Sq Epi: x / Non Sq Epi: Few / Bacteria: Occasional    RADIOLOGY & ADDITIONAL STUDIES:    CT Head No Cont: Urgent   Indication: ams, aphasia, rle weakness  Transport: Stretcher-Crib  Exam Completed  Provider's Contact #: [phone] ( @ 07:48)  POCT  Blood Glucose: 07:45 ( @ 07:50)  Comprehensive Metabolic Panel: STAT ( @ 07:58)  Complete Blood Count + Automated Diff: STAT ( @ 07:58)  Creatine Kinase, Serum: STAT ( @ 07:58)  CKMB Mass Assay: STAT ( @ 07:58)  Troponin I, Serum: STAT ( @ 07:58)  Activated Partial Thromboplastin Time:  Start Date:31-Mar-2018. STAT ( @ 07:58)  Prothrombin Time and INR, Plasma:  Start Date:31-Mar-2018. STAT ( @ 07:58)  Xray Chest 1 View AP/PA.: Urgent   Indication: ams  Transport: Portable  Exam Completed  Provider's Contact #: [phone] ( @ 07:58)  Saline Lock Insert.:     Time/Priority:  STAT ( @ 07:58)  sodium chloride 0.9% lock flush:   3 milliLiter(s), IV Push, once, Stop After 1 Doses ( @ 07:58)  Cardiac Monitor Bedside:     Time/Priority:  STAT ( @ 07:58)  12 Lead ECG:   Provider's Contact #: [phone] ( @ 07:58)  Urinalysis: STAT ( @ 07:58)  Culture - Urine: Routine  Specimen Source: Clean Catch (Midstream) ( @ 07:58)  Type + Screen: Routine ( @ 07:58)  Lactate, Blood: STAT ( @ 08:12)  Nucleated RBC: 08:30 ( @ 08:34)  Antibody Screen: 08:30 ( @ 08:34)  (Floorstock):   Qty Removed: 1 each ( @ 08:42)  Culture - Blood: Routine  Specimen Source: Blood ( @ 08:46)  Culture - Blood: Routine  Specimen Source: Blood ( @ 08:46)  cefTRIAXone   IVPB: [Known as ROCEPHIN  IVPB]  1 Gram(s) in dextrose 5% 50 milliLiter(s), IV Intermittent, Once, infuse over 30 Minute(s), Stop After 1 Doses  Indication: ifnxn  Administration Instructions: This is a Look-alike/Sound-alike Medication  Provider's Contact #: [phone] ( @ 08:46)  Culture - Other: Routine  Specimen Source: rt shoulder wound ( @ 08:46)  LORazepam   Injectable: [Ordered as ATIVAN Injectable]  0.5 milliGRAM(s), IV Push, Once, Stop After 1 Doses  Administration Instructions: This is a Look-alike/Sound-alike Medication  Provider's Contact #: [phone] ( @ 08:46)  sodium chloride 0.9% Bolus:   500 milliLiter(s), IV Bolus, once, infuse over 1 Hour(s), Stop After 1 Doses  Provider's Contact #: [phone] ( @ 08:46)  aspirin Suppository:   300 milliGRAM(s), Rectal, Once, Stop After 1 Doses  Administration Instructions: for rectal use  Provider's Contact #: [phone] ( @ 09:05)  (Floorstock):   Qty Removed: 1 each ( @ 09:12)  (Floorstock):   Qty Removed: 1 each ( @ 09:12)  Admit to Inpatient Level of Care:     Service:  TELEMETRY    Physician:  Juvenal Salguero    Additional Instructions:  Diagnosis: CVA (cerebral vascular accident); Cellulitis  Isolation: None  Special Consideration: None ( @ 09:19)  Admit from ED ( @ 09:35)  Urine Microscopic-Add On (NC): 09:05 ( @ 09:44)  finasteride: [Known as PROSCAR]  5 milliGRAM(s), Oral, daily  Administration Instructions: CAUTION: HAZARDOUS DRUG: Special Handling,Administration&Disposal Required  Pregnant women or women trying to conceive should not handle the product.  Provider's Contact #: [phone] ( @ 09:55)  aspirin  chewable:   81 milliGRAM(s), Oral, daily  Provider's Contact #: [phone] ( @ 09:55)  lisinopril: [Known as ZESTRIL]  40 milliGRAM(s), Oral, daily  Special Instructions: Hold for SBP <110  Provider's Contact #: [phone] ( @ 09:55)  atorvastatin: [Known as LIPITOR]  20 milliGRAM(s), Oral, at bedtime  Provider's Contact #: [phone] ( @ 09:55)  metoprolol succinate ER: [Known as TOPROL XL]  50 milliGRAM(s), Oral, daily  Special Instructions: Hold for SBP<110, HR<55  Administration Instructions: swallow whole * don't crush/chew  Provider's Contact #: [phone] ( @ 09:55)  theophylline ER Capsule: [Known as WILIAM-24]  400 milliGRAM(s), Oral, daily  Administration Instructions: swallow whole * don't crush/chew  Provider's Contact #: [phone] ( @ 09:55)  furosemide    Tablet: [Known as LASIX]  40 milliGRAM(s), Oral, daily  Provider's Contact #: [phone] ( @ 09:55)  docusate sodium: [Known as COLACE]  100 milliGRAM(s), Oral, two times a day  Provider's Contact #: [phone] ( @ 09:55)  montelukast: [Known as SINGULAIR]  10 milliGRAM(s), Oral, daily  Provider's Contact #: [phone] ( @ 09:55)  latanoprost 0.005% Ophthalmic Solution: [Known as XALATAN]  1 Drop(s), Both EYES, at bedtime  Provider's Contact #: [phone] ( @ 09:55)  tamsulosin: [Ordered as FLOMAX]  0.4 milliGRAM(s), Oral, at bedtime  Provider's Contact #: [phone] ( @ 10:15)  Admit to Inpatient Level of Care:     Service:  Telemetry    Physician:  Noemy ( @ 10:23)  Activity - Bed in Chair Mode ( @ 10:23)  Intermittent Pneumatic Compression:     Body Side:  Bilateral    Additional Instructions:  Apply device now and remove only for bathing and skin evaluation as per nursing protocol. ( @ 10:23)  Height/Length:     Frequency:  on admission ( @ 10:23)  Weight:     Frequency:  on admission ( @ 10:23)  Vital Signs:     Frequency:  every 8 hours ( @ 10:23)  Safety Precautions:     Time/Priority:  Routine ( @ 10:23)  Diet, NPO ( @ 10:23)  Basic Metabolic Panel: AM Sched. Collection: 2018 05:00 ( @ 10:23)  Complete Blood Count + Automated Diff: AM Sched. Collection: 2018 05:00 ( @ 10:23)  Remote Telemetry/EKG EXCEPT Off Unit Tests:     Time/Priority:  Routine ( @ 10:23)  BACItracin   Ointment:   1 Application(s), Topical, daily, Rt. Shoulder  Indication: Skin tear  Administration Instructions: external use only  This is a Look-alike/Sound-alike Medication  Provider's Contact #: [phone] ( @ 10:23)  ALBUTerol/ipratropium for Nebulization: Known as DUONEB  3 milliLiter(s), Nebulizer, every 8 hours, PRN for Bronchospasm  Special Instructions: Continue small volume nebulizer treatment until respiratory assessment and patient education determines treatment can be converted to MDI per Pharmacy and Therapeutics protocol. Discontinue nebulizer order once patient has converted to MDI.  Below is the criteria that must be met to make the conversion:  1.Patient is cooperative, alert, oriented & follows instructions  2.Patient has sufficient pre-bronchodilator inspiratory flow to be able to take a slow, deep inspiration and hold their breath f  Administration Instructions: Contains: 2.5 mG albuterol and 0.5 mG ipratropium  This is a Look-alike/Sound-alike Medication  Provider's Contact #: [phone] ( @ 10:32)  ALBUTerol    90 MICROgram(s) HFA Inhaler: Known as PROVENTIL HFA  1 Puff(s), Inhalation via Spacer, every 4 hours  Special Instructions: CONDITIONAL ORDER: ACTIVATE MDI order once respiratory assessment determines that patient education and treatment can be initiated and converted from small volume nebulizer to MDI per Pharmacy and Therapeutics protocol. Discontinue nebulizer order once patient has converted to MDI.  Below is the criteria that must be met to make the conversion:  1.Patient is cooperative, alert, oriented & follows instructions  2.Patient has sufficient pre-bronchodilator inspiratory flow to be able to take a slow  Provider's Contact #: [phone] ( @ 10:32)  tiotropium 18 MICROgram(s) Capsule: Known as SPIRIVA  1 Capsule(s), Inhalation, daily  Special Instructions: CONDITIONAL ORDER: ACTIVATE MDI order once respiratory assessment determines that patient education and treatment can be initiated and converted from small volume nebulizer to MDI per Pharmacy and Therapeutics protocol. Discontinue nebulizer order once patient has converted to MDI.  Below is the criteria that must be met to make the conversion:  1.Patient is cooperative, alert, oriented & follows instructions  2.Patient has sufficient pre-bronchodilator inspiratory flow to be able to take a slow  Provider's Contact #: [phone] ( @ 10:32)  buDESOnide  80 MICROgram(s)/formoterol 4.5 MICROgram(s) Inhaler: Ordered as SYMBICORT  80 MICROgram(s)  2 Puff(s), Inhalation, two times a day  Administration Instructions: shake well  Return unused medication to Pharmacy.  This is a Look-alike/Sound-alike Medication  Provider's Contact #: [phone] ( @ 10:32)  Diet, DASH/TLC:   Sodium & Cholesterol Restricted ( @ 10:43)  Remote Telemetry/EKG EXCEPT Off Unit Tests:     Time/Priority:  Routine ( @ 12:42)    Assessment & Opinion:  Altered Mental State, etiology to be determined.  Non-focal neurologic examination.    Recommendations:  EEG to rule out Seizure Episode.  Unable to do Brain MRI (PPM).  .  Carotid doppler.  Echocardiogram.      DVT prophylaxis as ordered.  PT/OT evaluation.  Medications:  Continue current medications.

## 2018-03-31 NOTE — ED ADULT TRIAGE NOTE - CHIEF COMPLAINT QUOTE
patient come as notification , patient was found weak andnonverbal with a strange behavior by his aid in the morning

## 2018-03-31 NOTE — H&P ADULT - HISTORY OF PRESENT ILLNESS
· HPI: 89yo male with pmh HTN, TIA, PPM, BPH, COPD, presents with ams. Per EMS (and confirmed with aid over phone), last seen normal at 11pm last night by aid. Aid came in this morning noted pt was altered and not really responsive. Pt currently only opens eyes and grunts to name and trying to persistently button his shirt. Per aid, pt is AOx3 at baseline and still drives and ambulates independently. Pt has been otherwise normal per aid last night.   H/O TIA in the past, last admitted to [facility] in June.

## 2018-03-31 NOTE — ED PROVIDER NOTE - OBJECTIVE STATEMENT
89yo male with pmh HTN, TIA, PPM, BPH, COPD, presents with ams. Per EMS (and confirmed with aid over phone), last seen normal at 11pm last night by aid. Aid came in this morning noted pt was altered and not really responsive. Pt currently only opens eyes and grunts to name and trying to persistently button his shirt. Per aid, pt is AOx3 at baseline and still drives and ambulates independently. Pt has been otherwise normal per aid last night.     No fever, vomiting, c/o cp, abd pain. 89yo male with pmh HTN, TIA, PPM, BPH, COPD, presents with ams. Per EMS (and confirmed with aid over phone), last seen normal at 11pm last night by aid. Aid came in this morning noted pt was altered and not really responsive. Pt currently only opens eyes and grunts to name and trying to persistently button his shirt. Per aid, pt is AOx3 at baseline and still drives and ambulates independently. Pt has been otherwise normal per aid last night.     family: [phone]    No fever, vomiting, c/o cp, abd pain.

## 2018-04-01 DIAGNOSIS — L03.119 CELLULITIS OF UNSPECIFIED PART OF LIMB: ICD-10-CM

## 2018-04-01 LAB
ANION GAP SERPL CALC-SCNC: 9 MMOL/L — SIGNIFICANT CHANGE UP (ref 5–17)
BASOPHILS # BLD AUTO: 0.02 K/UL — SIGNIFICANT CHANGE UP (ref 0–0.2)
BASOPHILS NFR BLD AUTO: 0.1 % — SIGNIFICANT CHANGE UP (ref 0–2)
BUN SERPL-MCNC: 20 MG/DL — SIGNIFICANT CHANGE UP (ref 7–23)
CALCIUM SERPL-MCNC: 8.9 MG/DL — SIGNIFICANT CHANGE UP (ref 8.5–10.1)
CHLORIDE SERPL-SCNC: 107 MMOL/L — SIGNIFICANT CHANGE UP (ref 96–108)
CO2 SERPL-SCNC: 26 MMOL/L — SIGNIFICANT CHANGE UP (ref 22–31)
CREAT SERPL-MCNC: 1.16 MG/DL — SIGNIFICANT CHANGE UP (ref 0.5–1.3)
CULTURE RESULTS: NO GROWTH — SIGNIFICANT CHANGE UP
EOSINOPHIL # BLD AUTO: 0.28 K/UL — SIGNIFICANT CHANGE UP (ref 0–0.5)
EOSINOPHIL NFR BLD AUTO: 2.1 % — SIGNIFICANT CHANGE UP (ref 0–6)
GLUCOSE SERPL-MCNC: 111 MG/DL — HIGH (ref 70–99)
HCT VFR BLD CALC: 36.3 % — LOW (ref 39–50)
HGB BLD-MCNC: 12.3 G/DL — LOW (ref 13–17)
IMM GRANULOCYTES NFR BLD AUTO: 0.4 % — SIGNIFICANT CHANGE UP (ref 0–1.5)
LACTATE SERPL-SCNC: 1 MMOL/L — SIGNIFICANT CHANGE UP (ref 0.7–2)
LYMPHOCYTES # BLD AUTO: 16.5 % — SIGNIFICANT CHANGE UP (ref 13–44)
LYMPHOCYTES # BLD AUTO: 2.23 K/UL — SIGNIFICANT CHANGE UP (ref 1–3.3)
MCHC RBC-ENTMCNC: 31.5 PG — SIGNIFICANT CHANGE UP (ref 27–34)
MCHC RBC-ENTMCNC: 33.9 GM/DL — SIGNIFICANT CHANGE UP (ref 32–36)
MCV RBC AUTO: 93.1 FL — SIGNIFICANT CHANGE UP (ref 80–100)
MONOCYTES # BLD AUTO: 0.86 K/UL — SIGNIFICANT CHANGE UP (ref 0–0.9)
MONOCYTES NFR BLD AUTO: 6.4 % — SIGNIFICANT CHANGE UP (ref 2–14)
NEUTROPHILS # BLD AUTO: 10.04 K/UL — HIGH (ref 1.8–7.4)
NEUTROPHILS NFR BLD AUTO: 74.5 % — SIGNIFICANT CHANGE UP (ref 43–77)
NRBC # BLD: 0 /100 WBCS — SIGNIFICANT CHANGE UP (ref 0–0)
PLATELET # BLD AUTO: 172 K/UL — SIGNIFICANT CHANGE UP (ref 150–400)
POTASSIUM SERPL-MCNC: 3.6 MMOL/L — SIGNIFICANT CHANGE UP (ref 3.5–5.3)
POTASSIUM SERPL-SCNC: 3.6 MMOL/L — SIGNIFICANT CHANGE UP (ref 3.5–5.3)
RBC # BLD: 3.9 M/UL — LOW (ref 4.2–5.8)
RBC # FLD: 14.2 % — SIGNIFICANT CHANGE UP (ref 10.3–14.5)
SODIUM SERPL-SCNC: 142 MMOL/L — SIGNIFICANT CHANGE UP (ref 135–145)
SPECIMEN SOURCE: SIGNIFICANT CHANGE UP
WBC # BLD: 13.49 K/UL — HIGH (ref 3.8–10.5)
WBC # FLD AUTO: 13.49 K/UL — HIGH (ref 3.8–10.5)

## 2018-04-01 RX ORDER — COLLAGENASE CLOSTRIDIUM HIST. 250 UNIT/G
1 OINTMENT (GRAM) TOPICAL DAILY
Qty: 0 | Refills: 0 | Status: DISCONTINUED | OUTPATIENT
Start: 2018-04-01 | End: 2018-04-02

## 2018-04-01 RX ORDER — CEPHALEXIN 500 MG
250 CAPSULE ORAL EVERY 6 HOURS
Qty: 0 | Refills: 0 | Status: DISCONTINUED | OUTPATIENT
Start: 2018-04-01 | End: 2018-04-02

## 2018-04-01 RX ADMIN — Medication 250 MILLIGRAM(S): at 14:13

## 2018-04-01 RX ADMIN — LATANOPROST 1 DROP(S): 0.05 SOLUTION/ DROPS OPHTHALMIC; TOPICAL at 00:05

## 2018-04-01 RX ADMIN — Medication 1 APPLICATION(S): at 14:13

## 2018-04-01 RX ADMIN — Medication 100 MILLIGRAM(S): at 19:11

## 2018-04-01 RX ADMIN — LATANOPROST 1 DROP(S): 0.05 SOLUTION/ DROPS OPHTHALMIC; TOPICAL at 23:04

## 2018-04-01 RX ADMIN — FINASTERIDE 5 MILLIGRAM(S): 5 TABLET, FILM COATED ORAL at 14:13

## 2018-04-01 RX ADMIN — TAMSULOSIN HYDROCHLORIDE 0.4 MILLIGRAM(S): 0.4 CAPSULE ORAL at 23:04

## 2018-04-01 RX ADMIN — Medication 81 MILLIGRAM(S): at 14:12

## 2018-04-01 RX ADMIN — Medication 250 MILLIGRAM(S): at 19:12

## 2018-04-01 RX ADMIN — BUDESONIDE AND FORMOTEROL FUMARATE DIHYDRATE 2 PUFF(S): 160; 4.5 AEROSOL RESPIRATORY (INHALATION) at 19:12

## 2018-04-01 RX ADMIN — Medication 40 MILLIGRAM(S): at 06:28

## 2018-04-01 RX ADMIN — Medication 50 MILLIGRAM(S): at 06:29

## 2018-04-01 RX ADMIN — Medication 400 MILLIGRAM(S): at 14:14

## 2018-04-01 RX ADMIN — Medication 100 MILLIGRAM(S): at 06:28

## 2018-04-01 RX ADMIN — MONTELUKAST 10 MILLIGRAM(S): 4 TABLET, CHEWABLE ORAL at 14:14

## 2018-04-01 RX ADMIN — Medication 250 MILLIGRAM(S): at 23:04

## 2018-04-01 RX ADMIN — LISINOPRIL 40 MILLIGRAM(S): 2.5 TABLET ORAL at 06:28

## 2018-04-01 RX ADMIN — BUDESONIDE AND FORMOTEROL FUMARATE DIHYDRATE 2 PUFF(S): 160; 4.5 AEROSOL RESPIRATORY (INHALATION) at 06:30

## 2018-04-01 RX ADMIN — ATORVASTATIN CALCIUM 20 MILLIGRAM(S): 80 TABLET, FILM COATED ORAL at 23:04

## 2018-04-01 NOTE — PHYSICAL THERAPY INITIAL EVALUATION ADULT - IMPAIRMENTS FOUND, PT EVAL
gait, locomotion, and balance/integumentary integrity/muscle strength/aerobic capacity/endurance/ergonomics and body mechanics/posture

## 2018-04-01 NOTE — PHYSICAL THERAPY INITIAL EVALUATION ADULT - GENERAL OBSERVATIONS, REHAB EVAL
Pt was seen in [facility] c cardiac monitor donned, alert and Ox4. Pt was comfortable and motivated. Son, Dr. David Solis at bedside.

## 2018-04-01 NOTE — PHYSICAL THERAPY INITIAL EVALUATION ADULT - CRITERIA FOR SKILLED THERAPEUTIC INTERVENTIONS
functional limitations in following categories/risk reduction/prevention/anticipated discharge recommendation/rehab potential/therapy frequency/impairments found/predicted duration of therapy intervention

## 2018-04-01 NOTE — PROGRESS NOTE ADULT - SUBJECTIVE AND OBJECTIVE BOX
INTERVAL HPI/OVERNIGHT EVENTS:  Subjective Complaints:  None.  Offers no complaints.  Sitting up and talking with no focal motor sensory deficits.  Son visiting by bedside.  He moves all extremities and in no acute distress.  RECENT RADIOLOGY & ADDITIONAL TESTS:  Brain CT negative for acute pathology.    NEUROLOGICAL EXAM (Pertinent):  Vital Signs Last 24 Hrs  T(C): 35.9 (2018 11:37), Max: 36.6 (2018 04:57)  T(F): 96.7 (2018 11:37), Max: 97.8 (2018 04:57)  HR: 62 (2018 11:37) (62 - 67)  BP: 121/64 (2018 11:37) (105/46 - 121/64)  BP(mean): --  RR: 17 (2018 11:37) (16 - 18)  SpO2: 97% (2018 11:37) (96% - 97%)    MEDICATIONS  (STANDING):  ALBUTerol    90 MICROgram(s) HFA Inhaler 1 Puff(s) Inhalation every 4 hours  aspirin  chewable 81 milliGRAM(s) Oral daily  atorvastatin 20 milliGRAM(s) Oral at bedtime  BACItracin   Ointment 1 Application(s) Topical daily  buDESOnide  80 MICROgram(s)/formoterol 4.5 MICROgram(s) Inhaler 2 Puff(s) Inhalation two times a day  cephalexin 250 milliGRAM(s) Oral every 6 hours  collagenase Ointment 1 Application(s) Topical daily  docusate sodium 100 milliGRAM(s) Oral two times a day  finasteride 5 milliGRAM(s) Oral daily  furosemide    Tablet 40 milliGRAM(s) Oral daily  latanoprost 0.005% Ophthalmic Solution 1 Drop(s) Both EYES at bedtime  lisinopril 40 milliGRAM(s) Oral daily  metoprolol succinate ER 50 milliGRAM(s) Oral daily  montelukast 10 milliGRAM(s) Oral daily  tamsulosin 0.4 milliGRAM(s) Oral at bedtime  theophylline ER Capsule 400 milliGRAM(s) Oral daily  tiotropium 18 MICROgram(s) Capsule 1 Capsule(s) Inhalation daily    MEDICATIONS  (PRN):  ALBUTerol/ipratropium for Nebulization 3 milliLiter(s) Nebulizer every 8 hours PRN Bronchospasm    LABS:                        12.3   13.49 )-----------( 172      ( 2018 06:29 )             36.3     04-    142  |  107  |  20  ----------------------------<  111<H>  3.6   |  26  |  1.16    Ca    8.9      2018 06:29    TPro  7.2  /  Alb  3.8  /  TBili  0.5  /  DBili  x   /  AST  21  /  ALT  31  /  AlkPhos  104  03-    PT/INR - ( 31 Mar 2018 08:33 )   PT: 11.5 sec;   INR: 1.05 ratio         PTT - ( 31 Mar 2018 08:33 )  PTT:24.1 sec  Urinalysis Basic - ( 31 Mar 2018 09:27 )    Color: Yellow / Appearance: Slightly Turbid / S.010 / pH: x  Gluc: x / Ketone: Negative  / Bili: Negative / Urobili: Negative mg/dL   Blood: x / Protein: 30 mg/dL / Nitrite: Negative   Leuk Esterase: Trace / RBC: 0-2 /HPF / WBC 6-10   Sq Epi: x / Non Sq Epi: Few / Bacteria: Occasional    Assessment & Opinion:  Altered Mental State, etiology to be determined.  Non-focal neurologic examination.    Recommendations:  EEG to rule out Seizure Episode.  Unable to do Brain MRI (PPM).  .  Carotid doppler.  Echocardiogram.      DVT prophylaxis as ordered.  PT/OT evaluation.  Medications:  Continue current medications. INTERVAL HPI/OVERNIGHT EVENTS:  Subjective Complaints:  None.  Offers no complaints.  Sitting up and talking with no focal motor sensory deficits.  Son visiting by bedside.  He moves all extremities and in no acute distress.  Read and appreciate PT notes.    RECENT RADIOLOGY & ADDITIONAL TESTS:  Brain CT negative for acute pathology.    NEUROLOGICAL EXAM (Pertinent):  Vital Signs Last 24 Hrs  T(C): 35.9 (2018 11:37), Max: 36.6 (2018 04:57)  T(F): 96.7 (2018 11:37), Max: 97.8 (2018 04:57)  HR: 62 (2018 11:37) (62 - 67)  BP: 121/64 (2018 11:37) (105/46 - 121/64)  BP(mean): --  RR: 17 (2018 11:37) (16 - 18)  SpO2: 97% (2018 11:37) (96% - 97%)    MEDICATIONS  (STANDING):  ALBUTerol    90 MICROgram(s) HFA Inhaler 1 Puff(s) Inhalation every 4 hours  aspirin  chewable 81 milliGRAM(s) Oral daily  atorvastatin 20 milliGRAM(s) Oral at bedtime  BACItracin   Ointment 1 Application(s) Topical daily  buDESOnide  80 MICROgram(s)/formoterol 4.5 MICROgram(s) Inhaler 2 Puff(s) Inhalation two times a day  cephalexin 250 milliGRAM(s) Oral every 6 hours  collagenase Ointment 1 Application(s) Topical daily  docusate sodium 100 milliGRAM(s) Oral two times a day  finasteride 5 milliGRAM(s) Oral daily  furosemide    Tablet 40 milliGRAM(s) Oral daily  latanoprost 0.005% Ophthalmic Solution 1 Drop(s) Both EYES at bedtime  lisinopril 40 milliGRAM(s) Oral daily  metoprolol succinate ER 50 milliGRAM(s) Oral daily  montelukast 10 milliGRAM(s) Oral daily  tamsulosin 0.4 milliGRAM(s) Oral at bedtime  theophylline ER Capsule 400 milliGRAM(s) Oral daily  tiotropium 18 MICROgram(s) Capsule 1 Capsule(s) Inhalation daily    MEDICATIONS  (PRN):  ALBUTerol/ipratropium for Nebulization 3 milliLiter(s) Nebulizer every 8 hours PRN Bronchospasm    LABS:                        12.3   13.49 )-----------( 172      ( 2018 06:29 )             36.3     04-    142  |  107  |  20  ----------------------------<  111<H>  3.6   |  26  |  1.16    Ca    8.9      2018 06:29    TPro  7.2  /  Alb  3.8  /  TBili  0.5  /  DBili  x   /  AST  21  /  ALT  31  /  AlkPhos  104  03-    PT/INR - ( 31 Mar 2018 08:33 )   PT: 11.5 sec;   INR: 1.05 ratio         PTT - ( 31 Mar 2018 08:33 )  PTT:24.1 sec  Urinalysis Basic - ( 31 Mar 2018 09:27 )    Color: Yellow / Appearance: Slightly Turbid / S.010 / pH: x  Gluc: x / Ketone: Negative  / Bili: Negative / Urobili: Negative mg/dL   Blood: x / Protein: 30 mg/dL / Nitrite: Negative   Leuk Esterase: Trace / RBC: 0-2 /HPF / WBC 6-10   Sq Epi: x / Non Sq Epi: Few / Bacteria: Occasional    Assessment & Opinion:  Altered Mental State, etiology to be determined.  Non-focal neurologic examination.    Recommendations:  Neurologically cleared for discharge with Home PT  DVT prophylaxis as ordered.

## 2018-04-01 NOTE — PROGRESS NOTE ADULT - SUBJECTIVE AND OBJECTIVE BOX
Patient is a 88y old  Male who presents with a chief complaint of AMS (31 Mar 2018 10:23)      INTERVAL HPI/OVERNIGHT EVENTS: Asymptomatic    MEDICATIONS  (STANDING):  ALBUTerol    90 MICROgram(s) HFA Inhaler 1 Puff(s) Inhalation every 4 hours  aspirin  chewable 81 milliGRAM(s) Oral daily  atorvastatin 20 milliGRAM(s) Oral at bedtime  BACItracin   Ointment 1 Application(s) Topical daily  buDESOnide  80 MICROgram(s)/formoterol 4.5 MICROgram(s) Inhaler 2 Puff(s) Inhalation two times a day  docusate sodium 100 milliGRAM(s) Oral two times a day  finasteride 5 milliGRAM(s) Oral daily  furosemide    Tablet 40 milliGRAM(s) Oral daily  latanoprost 0.005% Ophthalmic Solution 1 Drop(s) Both EYES at bedtime  lisinopril 40 milliGRAM(s) Oral daily  metoprolol succinate ER 50 milliGRAM(s) Oral daily  montelukast 10 milliGRAM(s) Oral daily  tamsulosin 0.4 milliGRAM(s) Oral at bedtime  theophylline ER Capsule 400 milliGRAM(s) Oral daily  tiotropium 18 MICROgram(s) Capsule 1 Capsule(s) Inhalation daily    MEDICATIONS  (PRN):  ALBUTerol/ipratropium for Nebulization 3 milliLiter(s) Nebulizer every 8 hours PRN Bronchospasm      Allergies    fluoroquinolone antibiotics (Muscle Pain)  statins (Muscle Pain; Joint Pain)    Intolerances        REVIEW OF SYSTEMS:  CONSTITUTIONAL: No fever, weight loss, or fatigue  EYES: No eye pain, visual disturbances, or discharge  ENMT:  No difficulty hearing, tinnitus, vertigo; No sinus or throat pain  NECK: No pain or stiffness  BREASTS: No pain, masses, or nipple discharge  RESPIRATORY: No cough, wheezing, chills or hemoptysis; No shortness of breath  CARDIOVASCULAR: No chest pain, palpitations, dizziness, or leg swelling  GASTROINTESTINAL: No abdominal or epigastric pain. No nausea, vomiting, or hematemesis; No diarrhea or constipation. No melena or hematochezia.  GENITOURINARY: No dysuria, frequency, hematuria, or incontinence  NEUROLOGICAL: No headaches, memory loss, loss of strength, numbness, or tremors  SKIN: No itching, burning, rashes, or lesions   LYMPH NODES: No enlarged glands  ENDOCRINE: No heat or cold intolerance; No hair loss  MUSCULOSKELETAL: No joint pain or swelling; No muscle, back, or extremity pain  PSYCHIATRIC: No depression, anxiety, mood swings, or difficulty sleeping  HEME/LYMPH: No easy bruising, or bleeding gums  ALLERGY AND IMMUNOLOGIC: No hives or eczema    Vital Signs Last 24 Hrs  T(C): 36.6 (2018 04:57), Max: 36.6 (31 Mar 2018 16:09)  T(F): 97.8 (2018 04:57), Max: 97.9 (31 Mar 2018 16:09)  HR: 67 (2018 04:57) (60 - 67)  BP: 105/46 (2018 04:57) (105/46 - 146/69)  BP(mean): --  RR: 18 (2018 04:57) (16 - 21)  SpO2: 96% (2018 04:57) (95% - 98%)    PHYSICAL EXAM:  GENERAL: NAD, well-groomed, well-developed  HEAD:  Atraumatic, Normocephalic  EYES: EOMI, PERRL, conjunctiva and sclera clear  ENMT:  Moist mucous membranes, Good dentition, No lesions  NECK: Supple, No JVD, Normal thyroid  NERVOUS SYSTEM:  Alert & Oriented X3, Good concentration; Motor Strength 5/5 B/L upper and lower extremities; DTRs 2+ intact and symmetric  CHEST/LUNG: Clear to percussion bilaterally; No rales, rhonchi, wheezing, or rubs  HEART: Regular rate and rhythm; No murmurs, rubs, or gallops  ABDOMEN: Soft, Nontender, Nondistended; Bowel sounds present  EXTREMITIES:  2+ Peripheral Pulses, No clubbing, cyanosis, or edema. Rt. shoulder superficial lesion  LYMPH: No lymphadenopathy noted  SKIN: No rashes or lesions    LABS:                        12.3   13.49 )-----------( 172      ( 2018 06:29 )             36.3     04-    142  |  107  |  20  ----------------------------<  111<H>  3.6   |  26  |  1.16    Ca    8.9      2018 06:29    TPro  7.2  /  Alb  3.8  /  TBili  0.5  /  DBili  x   /  AST  21  /  ALT  31  /  AlkPhos  104  -    PT/INR - ( 31 Mar 2018 08:33 )   PT: 11.5 sec;   INR: 1.05 ratio         PTT - ( 31 Mar 2018 08:33 )  PTT:24.1 sec  Urinalysis Basic - ( 31 Mar 2018 09:27 )    Color: Yellow / Appearance: Slightly Turbid / S.010 / pH: x  Gluc: x / Ketone: Negative  / Bili: Negative / Urobili: Negative mg/dL   Blood: x / Protein: 30 mg/dL / Nitrite: Negative   Leuk Esterase: Trace / RBC: 0-2 /HPF / WBC 6-10   Sq Epi: x / Non Sq Epi: Few / Bacteria: Occasional      CAPILLARY BLOOD GLUCOSE          CARDIAC MARKERS ( 31 Mar 2018 08:33 )  <.015 ng/mL / x     / 126 U/L / x     / 2.3 ng/mL              Culture - Other (collected 31 Mar 2018 11:08)  Source: Skin rt shoulder wound  Preliminary Report (2018 09:04):    Few Coag Negative Staphylococcus        RADIOLOGY & ADDITIONAL TESTS:  < from: CT Head No Cont (18 @ 08:08) >    EXAM:  CT BRAIN                            PROCEDURE DATE:  2018          INTERPRETATION:  CLINICAL INFORMATION: Altered mental status.    COMPARISON: 2017.    PROCEDURE:    Axial sections of the brain were obtained from base to vertex, without   the intravenous administration of contrast material. Sagittal and coronal   reformats were then generated from the axial images.    FINDINGS:    Multiple images are degraded by motion.    There is no intracranial hemorrhage, mass or shift ofthe midline   structures. There are involutional changes.    The brain stem is unremarkable.  No cerebellopontine angle lesion is   appreciated.     The fourth, third and lateral ventricles are normal position.  No   subdural or epidural collections are present.     No acute fracture is identified.     The mastoids are clear. There is paranasal sinus mucosal thickening.    IMPRESSION:    No acute intracranial hemorrhage.                BHAVANA POLLARD M.D., ATTENDING RADIOLOGIST  This document has been electronically signed. Mar 31 2018  8:17AM                < end of copied text >    Imaging Personally Reviewed:  [ ] YES  [ ] NO    Consultant(s) Notes Reviewed:  [ ] YES  [ ] NO    Care Discussed with Consultants/Other Providers [ ] YES  [ ] NO    PROBLEMS:  CEREBRAL VASCULAR ACCIDENT, CELLULITIS  CONFUSION  POSS STROKE  CVA (cerebral vascular accident)  Glaucoma of both eyes, unspecified glaucoma type  Essential hypertension  Benign prostatic hyperplasia with lower urinary tract symptoms, symptom details unspecified  Chronic obstructive pulmonary disease, unspecified COPD type  Cerebrovascular accident (CVA), unspecified mechanism      Care discussed with family,         [x  ]   yes  [  ]  No    imp:    stable[x ]    unstable[  ]     improving [   ]       unchanged  [  ]                Plans:  Continue present plans  [x  ]               New consult [  ]   specialty  .......               order yola[  ]    test name.                  Discharge Planning  [  ]

## 2018-04-01 NOTE — PHYSICAL THERAPY INITIAL EVALUATION ADULT - TINETTI BALANCE TEST, REHAB EVAL
Sitting Balance - 1/1 , Rises From Chair - 1/2, Attempts to rise -2 /2 , Immediate Standing Balance - 2/2,  Standing Balance -2 /2, Nudged - 2 /2, Eyes Closed - 1/1,  Turning 360 Deg - 1 /2, Sitting down - 1/2, Balance Score -13 /16

## 2018-04-01 NOTE — PHYSICAL THERAPY INITIAL EVALUATION ADULT - TINETTI GAIT TEST, REHAB EVAL
Indication of gait -1/1,   Step Length and height -2/2,   Foot Clearance 2-/2,   Step Symmetry - 1/1,  Step Continuity -1/1,   Path -1/ 2,   Trunk -1/2,   Walking Time -1/1,   Total Score - 10/12

## 2018-04-02 ENCOUNTER — TRANSCRIPTION ENCOUNTER (OUTPATIENT)
Age: 83
End: 2018-04-02

## 2018-04-02 VITALS
SYSTOLIC BLOOD PRESSURE: 110 MMHG | DIASTOLIC BLOOD PRESSURE: 73 MMHG | HEART RATE: 70 BPM | OXYGEN SATURATION: 97 % | RESPIRATION RATE: 17 BRPM

## 2018-04-02 PROCEDURE — 93880 EXTRACRANIAL BILAT STUDY: CPT | Mod: 26

## 2018-04-02 RX ORDER — BUDESONIDE AND FORMOTEROL FUMARATE DIHYDRATE 160; 4.5 UG/1; UG/1
2 AEROSOL RESPIRATORY (INHALATION)
Qty: 1 | Refills: 0 | OUTPATIENT
Start: 2018-04-02 | End: 2018-05-01

## 2018-04-02 RX ORDER — TAMSULOSIN HYDROCHLORIDE 0.4 MG/1
1 CAPSULE ORAL
Qty: 30 | Refills: 0 | OUTPATIENT
Start: 2018-04-02 | End: 2018-05-01

## 2018-04-02 RX ORDER — COLLAGENASE CLOSTRIDIUM HIST. 250 UNIT/G
1 OINTMENT (GRAM) TOPICAL
Qty: 15 | Refills: 0 | OUTPATIENT
Start: 2018-04-02 | End: 2018-04-11

## 2018-04-02 RX ORDER — BACITRACIN ZINC 500 UNIT/G
1 OINTMENT IN PACKET (EA) TOPICAL
Qty: 15 | Refills: 0 | OUTPATIENT
Start: 2018-04-02 | End: 2018-04-11

## 2018-04-02 RX ORDER — CEPHALEXIN 500 MG
1 CAPSULE ORAL
Qty: 28 | Refills: 0 | OUTPATIENT
Start: 2018-04-02 | End: 2018-04-08

## 2018-04-02 RX ADMIN — FINASTERIDE 5 MILLIGRAM(S): 5 TABLET, FILM COATED ORAL at 12:57

## 2018-04-02 RX ADMIN — Medication 40 MILLIGRAM(S): at 05:56

## 2018-04-02 RX ADMIN — BUDESONIDE AND FORMOTEROL FUMARATE DIHYDRATE 2 PUFF(S): 160; 4.5 AEROSOL RESPIRATORY (INHALATION) at 05:57

## 2018-04-02 RX ADMIN — Medication 250 MILLIGRAM(S): at 05:54

## 2018-04-02 RX ADMIN — Medication 81 MILLIGRAM(S): at 12:25

## 2018-04-02 RX ADMIN — Medication 100 MILLIGRAM(S): at 05:56

## 2018-04-02 RX ADMIN — BUDESONIDE AND FORMOTEROL FUMARATE DIHYDRATE 2 PUFF(S): 160; 4.5 AEROSOL RESPIRATORY (INHALATION) at 17:29

## 2018-04-02 RX ADMIN — MONTELUKAST 10 MILLIGRAM(S): 4 TABLET, CHEWABLE ORAL at 12:57

## 2018-04-02 RX ADMIN — LISINOPRIL 40 MILLIGRAM(S): 2.5 TABLET ORAL at 05:57

## 2018-04-02 RX ADMIN — Medication 250 MILLIGRAM(S): at 17:30

## 2018-04-02 RX ADMIN — Medication 1 APPLICATION(S): at 12:25

## 2018-04-02 RX ADMIN — Medication 50 MILLIGRAM(S): at 05:54

## 2018-04-02 RX ADMIN — Medication 100 MILLIGRAM(S): at 17:30

## 2018-04-02 RX ADMIN — Medication 400 MILLIGRAM(S): at 17:29

## 2018-04-02 RX ADMIN — Medication 1 APPLICATION(S): at 12:26

## 2018-04-02 RX ADMIN — Medication 250 MILLIGRAM(S): at 12:26

## 2018-04-02 NOTE — DISCHARGE NOTE ADULT - ADDITIONAL INSTRUCTIONS
Follow up with Dr. Rousseau (neurology) within 1 week of discharge date. Call office for appointment. Follow up with PCP within 2 weeks of discharge date for further medical management outpatient.

## 2018-04-02 NOTE — DISCHARGE NOTE ADULT - NS AS DC STROKE ED MATERIALS
Stroke Warning Signs and Symptoms/Call 911 for Stroke/Risk Factors for Stroke/Need for Followup After Discharge/Stroke Education Booklet/Prescribed Medications

## 2018-04-02 NOTE — DISCHARGE NOTE ADULT - OTHER SIGNIFICANT FINDINGS
< from: CT Head No Cont (03.31.18 @ 08:08) >  FINDINGS:    Multiple images are degraded by motion.    There is no intracranial hemorrhage, mass or shift ofthe midline   structures. There are involutional changes.    The brain stem is unremarkable.  No cerebellopontine angle lesion is   appreciated.     The fourth, third and lateral ventricles are normal position.  No   subdural or epidural collections are present.     No acute fracture is identified.     The mastoids are clear. There is paranasal sinus mucosal thickening.    IMPRESSION:    No acute intracranial hemorrhage.    < end of copied text >

## 2018-04-02 NOTE — DISCHARGE NOTE ADULT - PATIENT PORTAL LINK FT
You can access the [facility] Patient Portal, offered by [facility], by registering with the following website: [email or web address]

## 2018-04-02 NOTE — PROGRESS NOTE ADULT - SUBJECTIVE AND OBJECTIVE BOX
Patient is a 88y old  Male who presents with a chief complaint of AMS (31 Mar 2018 10:23)      INTERVAL HPI/OVERNIGHT EVENTS: Awake and alert    MEDICATIONS  (STANDING):  ALBUTerol    90 MICROgram(s) HFA Inhaler 1 Puff(s) Inhalation every 4 hours  aspirin  chewable 81 milliGRAM(s) Oral daily  atorvastatin 20 milliGRAM(s) Oral at bedtime  BACItracin   Ointment 1 Application(s) Topical daily  buDESOnide  80 MICROgram(s)/formoterol 4.5 MICROgram(s) Inhaler 2 Puff(s) Inhalation two times a day  cephalexin 250 milliGRAM(s) Oral every 6 hours  collagenase Ointment 1 Application(s) Topical daily  docusate sodium 100 milliGRAM(s) Oral two times a day  finasteride 5 milliGRAM(s) Oral daily  furosemide    Tablet 40 milliGRAM(s) Oral daily  latanoprost 0.005% Ophthalmic Solution 1 Drop(s) Both EYES at bedtime  lisinopril 40 milliGRAM(s) Oral daily  metoprolol succinate ER 50 milliGRAM(s) Oral daily  montelukast 10 milliGRAM(s) Oral daily  tamsulosin 0.4 milliGRAM(s) Oral at bedtime  theophylline ER Capsule 400 milliGRAM(s) Oral daily  tiotropium 18 MICROgram(s) Capsule 1 Capsule(s) Inhalation daily    MEDICATIONS  (PRN):  ALBUTerol/ipratropium for Nebulization 3 milliLiter(s) Nebulizer every 8 hours PRN Bronchospasm      Allergies    fluoroquinolone antibiotics (Muscle Pain)  statins (Muscle Pain; Joint Pain)    Intolerances        REVIEW OF SYSTEMS:  CONSTITUTIONAL: No fever, weight loss, or fatigue  EYES: No eye pain, visual disturbances, or discharge  ENMT:  No difficulty hearing, tinnitus, vertigo; No sinus or throat pain  NECK: No pain or stiffness  BREASTS: No pain, masses, or nipple discharge  RESPIRATORY: No cough, wheezing, chills or hemoptysis; No shortness of breath  CARDIOVASCULAR: No chest pain, palpitations, dizziness, or leg swelling  GASTROINTESTINAL: No abdominal or epigastric pain. No nausea, vomiting, or hematemesis; No diarrhea or constipation. No melena or hematochezia.  GENITOURINARY: No dysuria, frequency, hematuria, or incontinence  NEUROLOGICAL: No headaches, memory loss, loss of strength, numbness, or tremors  SKIN: No itching, burning, rashes, or lesions   LYMPH NODES: No enlarged glands  ENDOCRINE: No heat or cold intolerance; No hair loss  MUSCULOSKELETAL: No joint pain or swelling; No muscle, back, or extremity pain  PSYCHIATRIC: No depression, anxiety, mood swings, or difficulty sleeping  HEME/LYMPH: No easy bruising, or bleeding gums  ALLERGY AND IMMUNOLOGIC: No hives or eczema    Vital Signs Last 24 Hrs  T(C): 37.1 (2018 06:22), Max: 37.1 (2018 01:25)  T(F): 98.7 (2018 06:22), Max: 98.8 (2018 01:25)  HR: 60 (2018 06:) (60 - 67)  BP: 122/54 (2018 06:22) (102/54 - 122/54)  BP(mean): --  RR: 20 (2018 06:22) (16 - 20)  SpO2: 98% (2018 06:) (95% - 98%)    PHYSICAL EXAM:  GENERAL: NAD, well-groomed, well-developed  HEAD:  Atraumatic, Normocephalic  EYES: EOMI, PERRL, conjunctiva and sclera clear  ENMT:  Moist mucous membranes, Good dentition, No lesions  NECK: Supple, No JVD, Normal thyroid  NERVOUS SYSTEM:  Alert & Oriented X3, Good concentration; Motor Strength 5/5 B/L upper and lower extremities; DTRs 2+ intact and symmetric  CHEST/LUNG: Clear to percussion bilaterally; No rales, rhonchi, wheezing, or rubs  HEART: Regular rate and rhythm; No murmurs, rubs, or gallops  ABDOMEN: Soft, Nontender, Nondistended; Bowel sounds present  EXTREMITIES:  2+ Peripheral Pulses, No clubbing, cyanosis, or edema  LYMPH: No lymphadenopathy noted  SKIN: No rashes or lesions    LABS:                        12.3   13.49 )-----------( 172      ( 2018 06:29 )             36.3     04-    142  |  107  |  20  ----------------------------<  111<H>  3.6   |  26  |  1.16    Ca    8.9      2018 06:29    TPro  7.2  /  Alb  3.8  /  TBili  0.5  /  DBili  x   /  AST  21  /  ALT  31  /  AlkPhos  104  03-31    PT/INR - ( 31 Mar 2018 08:33 )   PT: 11.5 sec;   INR: 1.05 ratio         PTT - ( 31 Mar 2018 08:33 )  PTT:24.1 sec  Urinalysis Basic - ( 31 Mar 2018 09:27 )    Color: Yellow / Appearance: Slightly Turbid / S.010 / pH: x  Gluc: x / Ketone: Negative  / Bili: Negative / Urobili: Negative mg/dL   Blood: x / Protein: 30 mg/dL / Nitrite: Negative   Leuk Esterase: Trace / RBC: 0-2 /HPF / WBC 6-10   Sq Epi: x / Non Sq Epi: Few / Bacteria: Occasional      CAPILLARY BLOOD GLUCOSE          CARDIAC MARKERS ( 31 Mar 2018 08:33 )  <.015 ng/mL / x     / 126 U/L / x     / 2.3 ng/mL              Culture - Blood (collected 31 Mar 2018 11:26)  Source: .Blood Blood  Preliminary Report (2018 12:01):    No growth to date.    Culture - Blood (collected 31 Mar 2018 11:26)  Source: .Blood Blood  Preliminary Report (2018 12:01):    No growth to date.    Culture - Urine (collected 31 Mar 2018 11:21)  Source: .Urine Clean Catch (Midstream)  Final Report (2018 11:30):    No growth    Culture - Other (collected 31 Mar 2018 11:08)  Source: Skin rt shoulder wound  Preliminary Report (2018 09:04):    Few Coag Negative Staphylococcus        RADIOLOGY & ADDITIONAL TESTS:  < from: CT Head No Cont (18 @ 08:08) >    EXAM:  CT BRAIN                            PROCEDURE DATE:  2018          INTERPRETATION:  CLINICAL INFORMATION: Altered mental status.    COMPARISON: 2017.    PROCEDURE:    Axial sections of the brain were obtained from base to vertex, without   the intravenous administration of contrast material. Sagittal and coronal   reformats were then generated from the axial images.    FINDINGS:    Multiple images are degraded by motion.    There is no intracranial hemorrhage, mass or shift ofthe midline   structures. There are involutional changes.    The brain stem is unremarkable.  No cerebellopontine angle lesion is   appreciated.     The fourth, third and lateral ventricles are normal position.  No   subdural or epidural collections are present.     No acute fracture is identified.     The mastoids are clear. There is paranasal sinus mucosal thickening.    IMPRESSION:    No acute intracranial hemorrhage.                BHAVANA POLLARD M.D., ATTENDING RADIOLOGIST  This document has been electronically signed. Mar 31 2018  8:17AM                < end of copied text >    Imaging Personally Reviewed:  [ ] YES  [ ] NO    Consultant(s) Notes Reviewed:  [ ] YES  [ ] NO    Care Discussed with Consultants/Other Providers [ ] YES  [ ] NO    PROBLEMS:  CEREBRAL VASCULAR ACCIDENT, CELLULITIS  CONFUSION  POSS STROKE  CVA (cerebral vascular accident)  Cellulitis of shoulder  Glaucoma of both eyes, unspecified glaucoma type  Essential hypertension  Benign prostatic hyperplasia with lower urinary tract symptoms, symptom details unspecified  Chronic obstructive pulmonary disease, unspecified COPD type  Cerebrovascular accident (CVA), unspecified mechanism      Care discussed with family,         [  ]   yes  [  ]  No    imp:    stable[ ]    unstable[  ]     improving [   ]       unchanged  [  ]                Plans:  Continue present plans  [  ]               New consult [  ]   specialty  .......               order yola[  ]    test name.                  Discharge Planning  [  ]

## 2018-04-02 NOTE — DISCHARGE NOTE ADULT - CARE PROVIDER_API CALL
Antonio Rousseau), Neurology  [address] [facility]  [address]  [place] [address]  Phone: [phone]  Fax: [phone]    Juvenal Salguero (ROSARIO), Internal Medicine  [address] [place] [address]  Phone: [phone]  Fax: [phone]

## 2018-04-02 NOTE — DISCHARGE NOTE ADULT - MEDICATION SUMMARY - MEDICATIONS TO TAKE
I will START or STAY ON the medications listed below when I get home from the hospital:    Outpatient PT  -- For Balance and strength training of Bilateral LE.  -- Indication: For Balance and training    finasteride 5 mg oral tablet  -- 1 tab(s) by mouth once a day  -- Indication: For BPH    aspirin 81 mg oral tablet, chewable  -- 1 tab(s) by mouth once a day  -- Indication: For CVA (cerebral vascular accident)    lisinopril 40 mg oral tablet  -- 1 tab(s) by mouth once a day  -- Indication: For Hypertension    tamsulosin 0.4 mg oral capsule  -- 1 cap(s) by mouth once a day (at bedtime)  -- Indication: For BPH    atorvastatin 20 mg oral tablet  -- 1 tab(s) by mouth once a day (at bedtime)  -- Indication: For hyperlipidemia    Toprol-XL 50 mg oral tablet, extended release  -- 1 tab(s) by mouth once a day  -- Indication: For hypertension    theophylline 400 mg/24 hours oral capsule, extended release  -- 1 cap(s) by mouth once a day  -- Indication: For Chronic obstructive pulmonary disease, unspecified COPD type    Spiriva 18 mcg inhalation capsule  -- 1 cap(s) inhaled once a day  -- Indication: For Chronic obstructive pulmonary disease, unspecified COPD type    Advair Diskus 250 mcg-50 mcg inhalation powder  -- 1 puff(s) inhaled 2 times a day  -- Indication: For Chronic obstructive pulmonary disease, unspecified COPD type    ProAir HFA 90 mcg/inh inhalation aerosol  -- 2 puff(s) inhaled 4 times a day, As Needed  -- Indication: For Chronic obstructive pulmonary disease, unspecified COPD type    budesonide-formoterol 80 mcg-4.5 mcg/inh inhalation aerosol  -- 2 puff(s) inhaled 2 times a day   -- Indication: For Chronic obstructive pulmonary disease, unspecified COPD type    cephalexin 250 mg oral capsule  -- 1 cap(s) by mouth every 6 hours  -- Indication: For right shoulder skin tear    bacitracin 500 units/g topical ointment  -- 1 application on skin once a day  -- Indication: For right shoulder skin tear    collagenase 250 units/g topical ointment  -- 1 application on skin once a day  -- Indication: For right shoulder skin tear    furosemide 40 mg oral tablet  -- 1 tab(s) by mouth once a day  -- Indication: For hypertension    Colace 100 mg oral capsule  -- 1 cap(s) by mouth 2 times a day  -- Indication: For Constipation    Singulair 10 mg oral tablet  -- 1 tab(s) by mouth once a day  -- Indication: For Chronic obstructive pulmonary disease, unspecified COPD type    Xalatan 0.005% ophthalmic solution  -- 1 drop(s) to each affected eye once a day (in the morning)  -- Indication: For Glaucoma of both eyes, unspecified glaucoma type I will START or STAY ON the medications listed below when I get home from the hospital:    Outpatient PT  -- For Balance and strength training of Bilateral LE.  -- Indication: For rehab    finasteride 5 mg oral tablet  -- 1 tab(s) by mouth once a day  -- Indication: For BPH (benign prostatic hyperplasia)    aspirin 81 mg oral tablet, chewable  -- 1 tab(s) by mouth once a day  -- Indication: For Cardiac protection     lisinopril 40 mg oral tablet  -- 1 tab(s) by mouth once a day  -- Indication: For hypertension    tamsulosin 0.4 mg oral capsule  -- 1 cap(s) by mouth once a day (at bedtime)  -- Indication: For BPH (benign prostatic hyperplasia)    atorvastatin 20 mg oral tablet  -- 1 tab(s) by mouth once a day (at bedtime)  -- Indication: For Cholesterol    Toprol-XL 50 mg oral tablet, extended release  -- 1 tab(s) by mouth once a day  -- Indication: For hypertension    theophylline 400 mg/24 hours oral capsule, extended release  -- 1 cap(s) by mouth once a day  -- Indication: For  asthma    Spiriva 18 mcg inhalation capsule  -- 1 cap(s) inhaled once a day  -- Indication: For asthma    Advair Diskus 250 mcg-50 mcg inhalation powder  -- 1 puff(s) inhaled 2 times a day  -- Indication: For asthma    ProAir HFA 90 mcg/inh inhalation aerosol  -- 2 puff(s) inhaled 4 times a day, As Needed  -- Indication: For asthma    budesonide-formoterol 80 mcg-4.5 mcg/inh inhalation aerosol  -- 2 puff(s) inhaled 2 times a day   -- Indication: For asthma    cephalexin 250 mg oral capsule  -- 1 cap(s) by mouth every 6 hours  -- Indication: For antibiotics     bacitracin 500 units/g topical ointment  -- 1 application on skin once a day  -- Indication: For topical antibacterial    collagenase 250 units/g topical ointment  -- 1 application on skin once a day  -- Indication: For wound care    furosemide 40 mg oral tablet  -- 1 tab(s) by mouth once a day  -- Indication: For diuretics    Colace 100 mg oral capsule  -- 1 cap(s) by mouth 2 times a day  -- Indication: For stool softner    Singulair 10 mg oral tablet  -- 1 tab(s) by mouth once a day  -- Indication: For asthma    Xalatan 0.005% ophthalmic solution  -- 1 drop(s) to each affected eye once a day (in the morning)  -- Indication: For Eye drops

## 2018-04-02 NOTE — DISCHARGE NOTE ADULT - NS AS DC FOLLOWUP STROKE INST
Stroke (includes: TIA/SAH/ICH/Ischemic Stroke) Stroke (includes: TIA/SAH/ICH/Ischemic Stroke)/Smoking Cessation/Influenza vaccination (VIS Pub Date: August 7, 2015)

## 2018-04-02 NOTE — DISCHARGE NOTE ADULT - PLAN OF CARE
Carotid doppler, TTE, and EEG performed. Carotid doppler with 69% stenosis of left internal carotid artery. TTE with EF of 55%. EEG pending. Clear by Dr. Rousseau (neurology) for discharge. Patient is to follow up with Dr. Rousseau within 1 week of discharge date. Please call office for appointment. Follow up with primary care doctor within 2 weeks of discharge date for follow up appointment and continued medical management. continue tamsulosin for bph continue daily inhalers and medications for COPd. Follow up with PCP for continue outpatient management. continue bp medications continue glaucoma eyedrops Please apply bacitracin and collagenase to wound daily. Keflex 250 mg every 6 hours for 7 days. Follow up with PCP.

## 2018-04-02 NOTE — DISCHARGE NOTE ADULT - HOSPITAL COURSE
87yo male with pmh HTN, TIA, PPM, BPH, COPD, presents with ams. Per EMS (and confirmed with aid over phone), last seen normal at 11pm last night by aid. Aid came in this morning noted pt was altered and not really responsive. Pt currently only opens eyes and grunts to name and trying to persistently button his shirt. Per aid, pt is AOx3 at baseline and still drives and ambulates independently. Pt has been otherwise normal per aid last night.   H/O TIA in the past, last admitted to [facility] in June. NIH at ED melisa 15. CTH negative. Neurology consulted. Carotid doppler, TTE, and EEg performed. Patient now stable for discharge with out-patient physical therapy.

## 2018-04-02 NOTE — PROGRESS NOTE ADULT - ASSESSMENT
89yo male with pmh HTN, TIA, PPM, BPH, COPD, presents with ams. Per EMS (and confirmed with aid over phone), last seen normal at 11pm last night by aid. Aid came in this morning noted pt was altered and not really responsive. Pt currently only opens eyes and grunts to name and trying to persistently button his shirt. Per aid, pt is AOx3 at baseline and still drives and ambulates independently. Pt has been otherwise normal per aid last night.   H/O TIA in the past, last admitted to [facility] in June. Admit bto Monitored bed. Spoke to the sons at bed side.Neuro consult.  04/01/18 : Asymptomatic. Back to base line physically and mentally. Son at bedside. Neuro consult noted. W/U in progress. PT evaluation.
89yo male with pmh HTN, TIA, PPM, BPH, COPD, presents with ams. Per EMS (and confirmed with aid over phone), last seen normal at 11pm last night by aid. Aid came in this morning noted pt was altered and not really responsive. Pt currently only opens eyes and grunts to name and trying to persistently button his shirt. Per aid, pt is AOx3 at baseline and still drives and ambulates independently. Pt has been otherwise normal per aid last night.   H/O TIA in the past, last admitted to [facility] in June. Admit bto Monitored bed. Spoke to the sons at bed side.Neuro consult.  04/01/18 : Asymptomatic. Back to base line physically and mentally. Son at bedside. Neuro consult noted. W/U in progress. PT evaluation.   04/02/18 : Asymptomatic. Neuro base line. As per son, has statin allergy. D/C statin. Carotid doppler pendind, ECHO pending. No MR 2/2 pacemaker. Neuro F/U.

## 2018-04-02 NOTE — DISCHARGE NOTE ADULT - SECONDARY DIAGNOSIS.
BPH (benign prostatic hyperplasia) COPD (chronic obstructive pulmonary disease) Essential hypertension Glaucoma Cellulitis of shoulder

## 2018-04-02 NOTE — DISCHARGE NOTE ADULT - CARE PLAN
Principal Discharge DX:	Cerebrovascular accident (CVA), unspecified mechanism  Goal:	Carotid doppler, TTE, and EEG performed.  Assessment and plan of treatment:	Carotid doppler with 69% stenosis of left internal carotid artery. TTE with EF of 55%. EEG pending. Clear by Dr. Rousseau (neurology) for discharge. Patient is to follow up with Dr. Rousseau within 1 week of discharge date. Please call office for appointment. Follow up with primary care doctor within 2 weeks of discharge date for follow up appointment and continued medical management.  Secondary Diagnosis:	BPH (benign prostatic hyperplasia)  Assessment and plan of treatment:	continue tamsulosin for bph  Secondary Diagnosis:	COPD (chronic obstructive pulmonary disease)  Assessment and plan of treatment:	continue daily inhalers and medications for COPd. Follow up with PCP for continue outpatient management.  Secondary Diagnosis:	Essential hypertension  Assessment and plan of treatment:	continue bp medications  Secondary Diagnosis:	Glaucoma  Assessment and plan of treatment:	continue glaucoma eyedrops  Secondary Diagnosis:	Cellulitis of shoulder  Assessment and plan of treatment:	Please apply bacitracin and collagenase to wound daily. Keflex 250 mg every 6 hours for 7 days. Follow up with PCP.

## 2018-04-03 LAB
-  AMPICILLIN/SULBACTAM: SIGNIFICANT CHANGE UP
-  CEFAZOLIN: SIGNIFICANT CHANGE UP
-  CIPROFLOXACIN: SIGNIFICANT CHANGE UP
-  CLINDAMYCIN: SIGNIFICANT CHANGE UP
-  ERYTHROMYCIN: SIGNIFICANT CHANGE UP
-  GENTAMICIN: SIGNIFICANT CHANGE UP
-  LEVOFLOXACIN: SIGNIFICANT CHANGE UP
-  MOXIFLOXACIN(AEROBIC): SIGNIFICANT CHANGE UP
-  OXACILLIN: SIGNIFICANT CHANGE UP
-  PENICILLIN: SIGNIFICANT CHANGE UP
-  RIFAMPIN: SIGNIFICANT CHANGE UP
-  TETRACYCLINE: SIGNIFICANT CHANGE UP
-  TRIMETHOPRIM/SULFAMETHOXAZOLE: SIGNIFICANT CHANGE UP
-  VANCOMYCIN: SIGNIFICANT CHANGE UP
CULTURE RESULTS: SIGNIFICANT CHANGE UP
METHOD TYPE: SIGNIFICANT CHANGE UP
ORGANISM # SPEC MICROSCOPIC CNT: SIGNIFICANT CHANGE UP
ORGANISM # SPEC MICROSCOPIC CNT: SIGNIFICANT CHANGE UP
SPECIMEN SOURCE: SIGNIFICANT CHANGE UP

## 2018-04-05 DIAGNOSIS — Z95.0 PRESENCE OF CARDIAC PACEMAKER: ICD-10-CM

## 2018-04-05 DIAGNOSIS — J44.9 CHRONIC OBSTRUCTIVE PULMONARY DISEASE, UNSPECIFIED: ICD-10-CM

## 2018-04-05 DIAGNOSIS — I63.232 CEREBRAL INFARCTION DUE TO UNSPECIFIED OCCLUSION OR STENOSIS OF LEFT CAROTID ARTERIES: ICD-10-CM

## 2018-04-05 DIAGNOSIS — I10 ESSENTIAL (PRIMARY) HYPERTENSION: ICD-10-CM

## 2018-04-05 DIAGNOSIS — I63.9 CEREBRAL INFARCTION, UNSPECIFIED: ICD-10-CM

## 2018-04-05 DIAGNOSIS — N40.1 BENIGN PROSTATIC HYPERPLASIA WITH LOWER URINARY TRACT SYMPTOMS: ICD-10-CM

## 2018-04-05 DIAGNOSIS — R29.715 NIHSS SCORE 15: ICD-10-CM

## 2018-04-05 DIAGNOSIS — L03.113 CELLULITIS OF RIGHT UPPER LIMB: ICD-10-CM

## 2018-04-05 DIAGNOSIS — H40.9 UNSPECIFIED GLAUCOMA: ICD-10-CM

## 2018-04-05 DIAGNOSIS — Z86.73 PERSONAL HISTORY OF TRANSIENT ISCHEMIC ATTACK (TIA), AND CEREBRAL INFARCTION WITHOUT RESIDUAL DEFICITS: ICD-10-CM

## 2018-04-05 DIAGNOSIS — Z79.82 LONG TERM (CURRENT) USE OF ASPIRIN: ICD-10-CM

## 2018-04-05 LAB
CULTURE RESULTS: SIGNIFICANT CHANGE UP
CULTURE RESULTS: SIGNIFICANT CHANGE UP
SPECIMEN SOURCE: SIGNIFICANT CHANGE UP
SPECIMEN SOURCE: SIGNIFICANT CHANGE UP

## 2018-07-25 NOTE — ED ADULT NURSE NOTE - NS ED PATIENT SAFETY CONCERN
No Home Suture Removal Text: Patient was provided instructions on removing sutures and will remove their sutures at home.  If they have any questions or difficulties they will call the office.

## 2019-02-05 NOTE — DISCHARGE NOTE ADULT - PROVIDER RX CONTACT NUMBER
-- Message is from the [facility]--    Patient is requesting a medication refill - medication is on active list, but patient is requesting a change to the medication prescription    Change requested: To Dilantin 500mg    RX Name and Dose: Dilanton 500mg    Duration: 90 days    Pharmacy  [id number] - [place], [place] - [address] [facility] [address]    Patient confirmed the above pharmacy as correct?  Yes    Caller Information       Type Contact Phone    02/05/2019 11:21 AM Phone (Incoming) Ortiz Paige (Self) [phone] (M)          Alternative phone number: [phone]    Turnaround time given to caller:   \"This message will be sent to [state Provider's name]. The clinical team will fulfill your request as soon as they review your message.\"   [phone]

## 2019-06-05 NOTE — DISCHARGE NOTE ADULT - FUNCTIONAL SCREEN CURRENT LEVEL: BATHING, MLM
[facility] ED  [place] [address]  Phone: [phone]             June 5, 2019    Patient: Pola Ko   YOB: 1974   Date of Visit: 6/5/2019       To Whom It May Concern:    Pola Ko was seen and treated in our emergency department on 6/5/2019. He may return to work on 6/6/19.       Sincerely,             Signature:__________________________________
(2) assistive person

## 2019-07-08 NOTE — PATIENT PROFILE ADULT. - NS PRO TALK SOMEONE YN
-- Message is from the [facility]--    Order Request  Procedure: EEG    Message / reason: Please send to Neuro Science Department.     Preferred Delivery Method   Call when ready for pickup - phone number to notify: [phone]     Caller Information       Type Contact Phone    07/08/2019 09:32 AM Phone (Incoming) GEENA NUNESVALENTE LOLA (Mother) [phone] (M)          Alternative phone number:     Turnaround time given to caller:   \"This message will be sent to [state Provider's name]. The clinical team will fulfill your request as soon as they review your message.\"  
Faxed to .  
unable to assess

## 2020-05-19 NOTE — PHYSICAL THERAPY INITIAL EVALUATION ADULT - GAIT DEVIATIONS NOTED, PT EVAL
musculoskeletal
decreased velocity of limb motion/decreased stride length/decreased steve/decreased step length

## 2020-09-22 NOTE — ED ADULT TRIAGE NOTE - ESI TRIAGE ACUITY LEVEL, MLM
----- Message from Caleb Uribe sent at 9/22/2020 11:45 AM CDT -----  Regarding: Sx questions  Contact: pt   call     2

## 2021-05-12 NOTE — PROGRESS NOTE ADULT - PROBLEM SELECTOR PROBLEM 3
COPD (chronic obstructive pulmonary disease)
Elevated serum creatinine
COPD (chronic obstructive pulmonary disease)
163.83

## 2021-05-28 NOTE — PHYSICAL THERAPY INITIAL EVALUATION ADULT - DISCHARGE DISPOSITION, PT EVAL
"-- DO NOT REPLY / DO NOT REPLY ALL --  -- Message is from the [facility]--    COVID-19 Universal Screening: N/A - Not about scheduling    General Patient Message      Reason for Call: Patient mother says that patient is due to follow up, and Dr Joseph Nelson said that patient could see another provider at the office, but unsure of who that would be. Per mother, she prefers to go to the [place] office if possible. Please call to discuss at [phone]. Caller Information       Type Contact Phone    05/28/2021 09:59 AM CDT Phone (Incoming) Luis Alberto Power (Emergency Contact) [phone]          Alternative phone number: n/a    Turnaround time given to caller: ""This message will be sent to [facility] Provider's name]. The clinical team will fulfill your request as soon as they review your message. \""    " home/outpatient PT

## 2022-04-29 NOTE — PHYSICAL THERAPY INITIAL EVALUATION ADULT - PREDICTED DURATION OF THERAPY (DAYS/WKS), PT EVAL
Caller: Edgar Cotto    Relationship: Self    Best call back number: [phone]    What test was performed: NECK SCAN    When was the test performed: 04/27/22    Where was the test performed: N/A    Additional notes: PATIENT WOULD LIKE TO KNOW IF RESULTS ARE BACK    PLEASE ADVISE       by discharge

## 2023-08-30 NOTE — PHYSICAL THERAPY INITIAL EVALUATION ADULT - LEVEL OF CONSCIOUSNESS, REHAB EVAL
-- DO NOT REPLY / DO NOT REPLY ALL --  -- Message is from [facility] ([facility]) --    General Patient Message: Patient stating having heel pain and has some bleeding as well requesting advised from dr Elvia Portillo Information       Type Contact Phone/Fax    08/30/2023 11:50 AM CDT Phone (Incoming) Sujatha Rojas (Self) [phone] (H)        Alternative phone number:     Can a detailed message be left? Yes    Message Turnaround:     Is it Working Hours? Yes - Working Hours     [place]:    Please give this turnaround time to the caller:   \"This message will be sent to [state Provider's name]. The clinical team will fulfill your request as soon as they review your message.\"                
See next message   
alert
